# Patient Record
Sex: MALE | Race: WHITE | NOT HISPANIC OR LATINO | Employment: FULL TIME | URBAN - METROPOLITAN AREA
[De-identification: names, ages, dates, MRNs, and addresses within clinical notes are randomized per-mention and may not be internally consistent; named-entity substitution may affect disease eponyms.]

---

## 2017-10-16 ENCOUNTER — ALLSCRIPTS OFFICE VISIT (OUTPATIENT)
Dept: OTHER | Facility: OTHER | Age: 47
End: 2017-10-16

## 2017-10-16 DIAGNOSIS — E78.5 HYPERLIPIDEMIA: ICD-10-CM

## 2017-10-16 DIAGNOSIS — I10 ESSENTIAL (PRIMARY) HYPERTENSION: ICD-10-CM

## 2017-10-17 NOTE — PROGRESS NOTES
Assessment   1  Former smoker (V15 82) (F41 263)  2  Hypertension (401 9) (I10)  3  Hyperlipidemia (272 4) (E78 5)  4  Anxiety (300 00) (F41 9)  5  URI (upper respiratory infection) (465 9) (J06 9)    Plan    Depression screening    · *VB-Depression Screening; Status:Complete - Retrospective By Protocol Authorization;      Done: [id number] 12:00AM   · Temporarily Stop: Fluzone Quadrivalent Intramuscular Suspension  Hyperlipidemia    · (1) LIPID PANEL, FASTING; Status:Active; Requested for:16Oct2017;   Hyperlipidemia, Hypertension    · (1) COMPREHENSIVE METABOLIC PANEL; Status:Active; Requested for:16Oct2017;   PMH: Upper respiratory infection    · Stop: Guaifenesin-Codeine 100-10 MG/5ML SYRP  Sinusitis    · Start: Azithromycin 500 MG Oral Tablet; 2 tablets po first day then one daily for 4 days  URI (upper respiratory infection)    · Stop: Azithromycin 250 MG Oral Tablet   · Stop: Promethazine HCl - 6 25 MG/5ML Oral Syrup   · Stop: Promethazine-Codeine 6 25-10 MG/5ML Oral Syrup  Unlinked    · Stop: Allopurinol TABS   · Stop: Citalopram Hydrobromide TABS   · Stop: Lisinopril TABS   · Stop: Omeprazole 20 MG CPCR   · Stop: Propranolol HCl TABS   · Stop: Simvastatin TABS      Follow-up visit in 4 Months Evaluation and Treatment  Follow-up  Status: Hold For - Scheduling  Requested for: [id number]     Ordered; For: Anxiety, GERD (gastroesophageal reflux disease), Hyperlipidemia, Hypertension;  Ordered By: Haider Chavez  Performed:   Due: [id number]       Discussion/Summary   Discussion Summary:    Rx amoxil 500mg tid for 7 d  Increase water intake  Continue present and cpap  Exercise encouraged  He will get flu shot at work  Rto 6 months  Rx fbw1              Medication SE Review and Pt Understands Tx: Possible side effects of new medications were reviewed with the patient/guardian today  The treatment plan was reviewed with the patient/guardian   The patient/guardian understands and agrees with the treatment plan Self Referrals:    Self Referrals: No       1 Amended By: Kenny Perez; Oct 16 2017 10:07 PM EST      Chief Complaint   Chief Complaint Free Text Note Form: Pt presents today as a new patient  Medications updated- pt will require refills on all six medications  No new allergies  Pt gets annual flu shot by employer  Pt states that he utilizes a cpap machine at night  No difficulties with eating, sleeping, or feelings of depression  History of Present Illness   HPI: Pt has been ok  Developed congestion and fatigue past few days  No fever or cough  Otherwise well  No cp or sob  Bike riding recently for exercise  No recent testing for over a 1  1,2  year  Uses cpap nightly  Feels citalopram has been very helpful  2        1 Amended By: Kenny Perez; Oct 16 2017 10:01 PM EST    2 Amended By: Kenny Perez; Oct 16 2017 10:04 PM EST      Review of Systems   Complete-Male:      Constitutional:1  feeling tired1 , but-- no fever1 -- and-- no chills1   Eyes:1  No complaints of eye pain, no red eyes, no discharge from eyes, no itchy eyes1   ENT:1  earache1 -- and-- nasal discharge1   Cardiovascular: 1  No complaints of slow heart rate, no fast heart rate, no chest pain, no palpitations, no leg claudication, no lower extremity1   Respiratory:1  No complaints of shortness of breath, no wheezing, no cough, no SOB on exertion, no orthopnea or PND1   Gastrointestinal:1  No complaints of abdominal pain, no constipation, no nausea or vomiting, no diarrhea or bloody stools1   Genitourinary:1  No complaints of dysuria, no incontinence, no hesitancy, no nocturia, no genital lesion, no testicular pain1   Musculoskeletal:1  No complaints of arthralgia, no myalgias, no joint swelling or stiffness, no limb pain or swelling1   Integumentary:1  No complaints of skin rash or skin lesions, no itching, no skin wound, no dry skin1         Neurological:1  No compliants of headache, no confusion, no convulsions, no numbness or tingling, no dizziness or fainting, no limb weakness, no difficulty walking1   Psychiatric:1  Is not suicidal, no sleep disturbances, no anxiety or depression, no change in personality, no emotional problems1 -- and-- no sleep disturbances1   Endocrine:1  No complaints of proptosis, no hot flashes, no muscle weakness, no erectile dysfunction, no deepening of the voice, no feelings of weakness1   Hematologic/Lymphatic:1  No complaints of swollen glands, no swollen glands in the neck, does not bleed easily, no easy bruising1         1 Amended By: Jackeline Dunn; Oct 16 2017 10:02 PM EST      Active Problems   1  Anxiety (300 00) (F41 9)  2  GERD (gastroesophageal reflux disease) (530 81) (K21 9)  3  Gout (274 9) (M10 9)  4  Hyperlipidemia (272 4) (E78 5)  5  Hypertension (401 9) (I10)  6  URI (upper respiratory infection) (465 9) (J06 9)    Past Medical History   1  No pertinent past medical history  Active Problems And Past Medical History Reviewed: The active problems and past medical history were reviewed and updated today  Surgical History   1  Denied: History Of Prior Surgery  Surgical History Reviewed: The surgical history was reviewed and updated today  Family History   Mother   1  Family history of malignant neoplasm of breast (V16 3) (Z80 3)  Father   2  Family history of diabetes mellitus (V18 0) (Z83 3)  3  Family history of non-Hodgkin's lymphoma (V16 7) (Z80 7)  Family History Reviewed: The family history was reviewed and updated today  Social History    · Former smoker (L60 66) (A30 650)  Social History Reviewed: The social history was reviewed and updated today  The social history was reviewed and is unchanged  Current Meds   1  Allopurinol 100 MG Oral Tablet; take one tablet by mouth every day; Therapy: (Recorded:16Oct2017) to Recorded  2  Allopurinol TABS; Therapy: (Recorded:07Jun2014) to Recorded  3  Azithromycin 250 MG Oral Tablet; TAKE 2 TABLETS ON DAY 1 THEN TAKE 1 TABLET A     DAY FOR 4 DAYS; Therapy: [id number] to (Last Rx:16Nov2014)  Requested for: [id number] Ordered  4  Citalopram Hydrobromide 40 MG Oral Tablet; TAKE 1 TABLET DAILY; Therapy: (Recorded:16Oct2017) to Recorded  5  Citalopram Hydrobromide TABS; Therapy: (Recorded:07Jun2014) to Recorded  6  Guaifenesin-Codeine 100-10 MG/5ML SYRP; TAKE 1 TO 2 TEASPOONSFUL EVERY 4     TO 6 HOURS AS NEEDED FOR COUGH; Therapy: [id number] to (Evaluate:19Jun2014); Last Rx:10Jun2014 Ordered  7  Lisinopril 5 MG Oral Tablet; TAKE 1 TABLET DAILY; Therapy: (Recorded:16Oct2017) to Recorded  8  Lisinopril TABS; Therapy: (Recorded:07Jun2014) to Recorded  9  Omeprazole 20 MG CPCR; Therapy: (Recorded:07Jun2014) to Recorded  10  Omeprazole 20 MG Oral Capsule Delayed Release; take 1 capsule by mouth once daily; Therapy: (Recorded:16Oct2017) to Recorded  11  Promethazine HCl - 6 25 MG/5ML Oral Syrup; TAKE 2 TEASPOONSFUL EVERY 12      HOURS DAILY; Therapy: [id number] to (Emily Harris)  Requested for: [id number]; Last      Rx:16Nov2014 Ordered  12  Promethazine-Codeine 6 25-10 MG/5ML Oral Syrup; TAKE 1 TEASPOONFUL AT      BEDTIME AS NEEDED; Therapy: [id number] to (Evaluate:29Dwy7136); Last Rx:16Nov2014 Ordered  13  Propranolol HCl - 60 MG Oral Tablet; Take 1 tablet daily; Therapy: (Recorded:16Oct2017) to Recorded  14  Propranolol HCl TABS; Therapy: (Recorded:07Jun2014) to Recorded  15  Simvastatin 20 MG Oral Tablet; TAKE 1 TABLET DAILY; Therapy: (Recorded:16Oct2017) to Recorded  16  Simvastatin TABS; Therapy: (Recorded:07Jun2014) to Recorded    Allergies   1   No Known Drug Allergies    Vitals   Signs   Recorded: 40GAX9120 35:24EN    Systolic: 506 1     Diastolic: 72 1    Recorded: 16Oct2017 05:37PM   Temperature: 98 F  Heart Rate: 73  Height: 6 ft   Weight: 244 lb 6 oz  BMI Calculated: 33 14  BSA Calculated: 2 32  O2 Saturation: 95     1 Amended By: Kyler Gottlieb; Oct 16 2017 10:02 PM EST      Physical Exam        Constitutional1       General appearance: No acute distress, well appearing and well nourished  1       Eyes1       Conjunctiva and lids: No swelling, erythema, or discharge  1       Pupils and irises: Equal, round and reactive to light  1       Ears, Nose, Mouth, and Throat1       External inspection of ears and nose: Normal 1       Otoscopic examination: Tympanic membrance translucent with normal light reflex  Canals patent without erythema  1       Nasal mucosa, septum, and turbinates: Abnormal  1 -- Mucous congestion1   Oropharynx: Abnormal  1 -- Pnd1   Pulmonary1       Respiratory effort: No increased work of breathing or signs of respiratory distress  1       Auscultation of lungs: Clear to auscultation, equal breath sounds bilaterally, no wheezes, no rales, no rhonci  1       Cardiovascular1       Palpation of heart: Normal PMI, no thrills  1       Auscultation of heart: Normal rate and rhythm, normal S1 and S2, without murmurs  1       Examination of extremities for edema and/or varicosities: Normal 1       Carotid pulses: Normal 1       Abdomen1       Abdomen: Non-tender, no masses  1       Liver and spleen: No hepatomegaly or splenomegaly  1       Lymphatic1       Palpation of lymph nodes in neck: No lymphadenopathy  1       Musculoskeletal1       Gait and station: Normal 1       Digits and nails: Normal without clubbing or cyanosis  1       Inspection/palpation of joints, bones, and muscles: Normal 1       Skin1       Skin and subcutaneous tissue: Normal without rashes or lesions  1       Neurologic1       Cranial nerves: Cranial nerves 2-12 intact  1       Reflexes: 2+ and symmetric  1       Sensation: No sensory loss  1       Psychiatric1       Orientation to person, place and time: Normal 1       Mood and affect: Normal 1           1 Amended By: Kyler Gottlieb;  Oct 16 2017 10:05 PM EST      Signatures Electronically signed by : Tamara Mcdowell DO; Oct 16 2017  6:20PM EST                       (Author)     Electronically signed by : Tamara Mcdowell DO; Oct 16 2017 10:07PM EST                       (Author)

## 2017-10-28 ENCOUNTER — TRANSCRIBE ORDERS (OUTPATIENT)
Dept: LAB | Facility: MEDICAL CENTER | Age: 47
End: 2017-10-28

## 2017-10-28 ENCOUNTER — APPOINTMENT (OUTPATIENT)
Dept: LAB | Facility: MEDICAL CENTER | Age: 47
End: 2017-10-28
Payer: COMMERCIAL

## 2017-10-28 DIAGNOSIS — E78.5 HYPERLIPIDEMIA: ICD-10-CM

## 2017-10-28 DIAGNOSIS — I10 ESSENTIAL (PRIMARY) HYPERTENSION: ICD-10-CM

## 2017-10-28 LAB
ALBUMIN SERPL BCP-MCNC: 4.4 G/DL (ref 3.5–5)
ALP SERPL-CCNC: 74 U/L (ref 46–116)
ALT SERPL W P-5'-P-CCNC: 56 U/L (ref 12–78)
ANION GAP SERPL CALCULATED.3IONS-SCNC: 7 MMOL/L (ref 4–13)
AST SERPL W P-5'-P-CCNC: 33 U/L (ref 5–45)
BILIRUB SERPL-MCNC: 0.94 MG/DL (ref 0.2–1)
BUN SERPL-MCNC: 15 MG/DL (ref 5–25)
CALCIUM SERPL-MCNC: 9.5 MG/DL (ref 8.3–10.1)
CHLORIDE SERPL-SCNC: 108 MMOL/L (ref 100–108)
CHOLEST SERPL-MCNC: 144 MG/DL (ref 50–200)
CO2 SERPL-SCNC: 24 MMOL/L (ref 21–32)
CREAT SERPL-MCNC: 1.12 MG/DL (ref 0.6–1.3)
GFR SERPL CREATININE-BSD FRML MDRD: 77 ML/MIN/1.73SQ M
GLUCOSE P FAST SERPL-MCNC: 89 MG/DL (ref 65–99)
HDLC SERPL-MCNC: 53 MG/DL (ref 40–60)
LDLC SERPL CALC-MCNC: 57 MG/DL (ref 0–100)
POTASSIUM SERPL-SCNC: 4 MMOL/L (ref 3.5–5.3)
PROT SERPL-MCNC: 8 G/DL (ref 6.4–8.2)
SODIUM SERPL-SCNC: 139 MMOL/L (ref 136–145)
TRIGL SERPL-MCNC: 171 MG/DL

## 2017-10-28 PROCEDURE — 36415 COLL VENOUS BLD VENIPUNCTURE: CPT

## 2017-10-28 PROCEDURE — 80053 COMPREHEN METABOLIC PANEL: CPT

## 2017-10-28 PROCEDURE — 80061 LIPID PANEL: CPT

## 2018-01-14 VITALS
DIASTOLIC BLOOD PRESSURE: 72 MMHG | SYSTOLIC BLOOD PRESSURE: 112 MMHG | HEART RATE: 73 BPM | HEIGHT: 72 IN | WEIGHT: 244.38 LBS | BODY MASS INDEX: 33.1 KG/M2 | OXYGEN SATURATION: 95 % | TEMPERATURE: 98 F

## 2018-09-15 ENCOUNTER — TELEPHONE (OUTPATIENT)
Dept: INTERNAL MEDICINE CLINIC | Facility: CLINIC | Age: 48
End: 2018-09-15

## 2018-10-09 DIAGNOSIS — K21.9 GASTROESOPHAGEAL REFLUX DISEASE, ESOPHAGITIS PRESENCE NOT SPECIFIED: Primary | ICD-10-CM

## 2018-10-09 RX ORDER — OMEPRAZOLE 20 MG/1
CAPSULE, DELAYED RELEASE ORAL
Qty: 90 CAPSULE | Refills: 3 | Status: SHIPPED | OUTPATIENT
Start: 2018-10-09 | End: 2019-10-06 | Stop reason: SDUPTHER

## 2018-10-28 DIAGNOSIS — F32.A DEPRESSION, UNSPECIFIED DEPRESSION TYPE: Primary | ICD-10-CM

## 2018-10-28 DIAGNOSIS — M10.9 GOUT, UNSPECIFIED CAUSE, UNSPECIFIED CHRONICITY, UNSPECIFIED SITE: ICD-10-CM

## 2018-10-28 DIAGNOSIS — E78.2 MIXED HYPERLIPIDEMIA: ICD-10-CM

## 2018-10-28 DIAGNOSIS — I10 HYPERTENSION, UNSPECIFIED TYPE: ICD-10-CM

## 2018-10-28 RX ORDER — LISINOPRIL 5 MG/1
TABLET ORAL
Qty: 90 TABLET | Refills: 3 | Status: SHIPPED | OUTPATIENT
Start: 2018-10-28 | End: 2019-10-23 | Stop reason: SDUPTHER

## 2018-10-28 RX ORDER — SIMVASTATIN 20 MG
TABLET ORAL
Qty: 90 TABLET | Refills: 3 | Status: SHIPPED | OUTPATIENT
Start: 2018-10-28 | End: 2019-10-23 | Stop reason: SDUPTHER

## 2018-10-28 RX ORDER — ALLOPURINOL 100 MG/1
TABLET ORAL
Qty: 90 TABLET | Refills: 3 | Status: SHIPPED | OUTPATIENT
Start: 2018-10-28 | End: 2019-10-23 | Stop reason: SDUPTHER

## 2018-10-28 RX ORDER — CITALOPRAM 40 MG/1
TABLET ORAL
Qty: 90 TABLET | Refills: 3 | Status: SHIPPED | OUTPATIENT
Start: 2018-10-28 | End: 2019-10-23 | Stop reason: SDUPTHER

## 2018-12-21 RX ORDER — INFLUENZA VIRUS VACCINE 15; 15; 15; 15 UG/.5ML; UG/.5ML; UG/.5ML; UG/.5ML
SUSPENSION INTRAMUSCULAR
Refills: 0 | COMMUNITY
Start: 2018-11-02 | End: 2018-12-26 | Stop reason: ALTCHOICE

## 2018-12-26 ENCOUNTER — OFFICE VISIT (OUTPATIENT)
Dept: INTERNAL MEDICINE CLINIC | Facility: CLINIC | Age: 48
End: 2018-12-26
Payer: COMMERCIAL

## 2018-12-26 VITALS
HEART RATE: 66 BPM | SYSTOLIC BLOOD PRESSURE: 124 MMHG | BODY MASS INDEX: 32.7 KG/M2 | HEIGHT: 72 IN | DIASTOLIC BLOOD PRESSURE: 70 MMHG | WEIGHT: 241.4 LBS | TEMPERATURE: 98.5 F | OXYGEN SATURATION: 96 %

## 2018-12-26 DIAGNOSIS — Z12.11 COLON CANCER SCREENING: ICD-10-CM

## 2018-12-26 DIAGNOSIS — I10 ESSENTIAL HYPERTENSION: Primary | ICD-10-CM

## 2018-12-26 DIAGNOSIS — E78.2 MIXED HYPERLIPIDEMIA: ICD-10-CM

## 2018-12-26 DIAGNOSIS — K21.9 GASTROESOPHAGEAL REFLUX DISEASE, ESOPHAGITIS PRESENCE NOT SPECIFIED: ICD-10-CM

## 2018-12-26 PROCEDURE — 99396 PREV VISIT EST AGE 40-64: CPT | Performed by: INTERNAL MEDICINE

## 2018-12-26 RX ORDER — DIPHENOXYLATE HYDROCHLORIDE AND ATROPINE SULFATE 2.5; .025 MG/1; MG/1
1 TABLET ORAL DAILY
COMMUNITY

## 2018-12-26 RX ORDER — VITAMIN B COMPLEX
1 CAPSULE ORAL DAILY
COMMUNITY

## 2018-12-26 NOTE — PROGRESS NOTES
Assessment/Plan:         Diagnoses and all orders for this visit:    Essential hypertension  Stable on present rx  No added salt Exercise encouraged    Colon cancer screening  -     Occult Blood, Fecal Immunochemical; Future  Did discuss colonocsopy    Gastroesophageal reflux disease, esophagitis presence not specified  Encouraged patient to trial off PPI   Exercise,diet reinforced    Mixed hyperlipidemia  Rx for fasting lipid panel  Exercise,low fat diet    Other orders  -     Discontinue: FLUARIX QUADRIVALENT 0 5 ML TIM; inject 0 5 milliliter intramuscularly  -     b complex vitamins capsule; Take 1 capsule by mouth daily  -     Cholecalciferol (VITAMIN D PO); Take 1 tablet by mouth daily  -     multivitamin (THERAGRAN) TABS; Take 1 tablet by mouth daily  Rto 1 year/prn     Patient ID: Carey Cape is a 46 y o  male  HPI   Pt feels well No acute issues No chest pain or sob  He has not had any acid reflux sxs  His diet he admits is not very healthy but he is happy  He has not been exercising regularly  Review of Systems   Constitutional: Negative  HENT: Negative  Eyes: Negative  Respiratory: Negative  Cardiovascular: Negative  Gastrointestinal: Negative  Endocrine: Negative  Genitourinary: Negative  Musculoskeletal: Negative  Skin: Negative  Allergic/Immunologic: Negative  Neurological: Negative  Hematological: Negative  Psychiatric/Behavioral: Negative  No past medical history on file  Past Surgical History:   Procedure Laterality Date    NO PAST SURGERIES       Social History     Social History    Marital status: Unknown     Spouse name: N/A    Number of children: N/A    Years of education: N/A     Occupational History    Not on file       Social History Main Topics    Smoking status: Former Smoker    Smokeless tobacco: Never Used    Alcohol use Yes      Comment: occassionally    Drug use: No    Sexual activity: Not on file     Other Topics Concern    Not on file     Social History Narrative    No narrative on file     No Known Allergies        /70   Pulse 66   Temp 98 5 °F (36 9 °C) (Tympanic)   Ht 6' (1 829 m)   Wt 109 kg (241 lb 6 4 oz)   SpO2 96%   BMI 32 74 kg/m²          Physical Exam   Constitutional: He is oriented to person, place, and time  He appears well-developed  No distress  HENT:   Head: Normocephalic and atraumatic  Right Ear: External ear normal    Left Ear: External ear normal    Nose: Nose normal    Mouth/Throat: Oropharynx is clear and moist  No oropharyngeal exudate  Eyes: Pupils are equal, round, and reactive to light  Conjunctivae and EOM are normal  No scleral icterus  Neck: Normal range of motion  Neck supple  No JVD present  Cardiovascular: Normal rate, regular rhythm, normal heart sounds and intact distal pulses  No murmur heard  Pulmonary/Chest: Effort normal and breath sounds normal  No respiratory distress  He has no wheezes  He has no rales  Abdominal: Soft  Bowel sounds are normal  He exhibits no distension  There is no tenderness  There is no rebound  Musculoskeletal: Normal range of motion  He exhibits no edema, tenderness or deformity  Lymphadenopathy:     He has no cervical adenopathy  Neurological: He is alert and oriented to person, place, and time  He has normal reflexes  He displays normal reflexes  No cranial nerve deficit  He exhibits normal muscle tone  Coordination normal    Skin: Skin is warm and dry  He is not diaphoretic  Psychiatric: He has a normal mood and affect  His behavior is normal  Judgment and thought content normal    Nursing note and vitals reviewed

## 2018-12-29 ENCOUNTER — LAB (OUTPATIENT)
Dept: LAB | Facility: MEDICAL CENTER | Age: 48
End: 2018-12-29
Payer: COMMERCIAL

## 2018-12-29 DIAGNOSIS — E78.2 MIXED HYPERLIPIDEMIA: ICD-10-CM

## 2018-12-29 DIAGNOSIS — Z12.11 COLON CANCER SCREENING: ICD-10-CM

## 2018-12-29 DIAGNOSIS — I10 ESSENTIAL HYPERTENSION: ICD-10-CM

## 2018-12-29 LAB
ALBUMIN SERPL BCP-MCNC: 3.8 G/DL (ref 3.5–5)
ALP SERPL-CCNC: 70 U/L (ref 46–116)
ALT SERPL W P-5'-P-CCNC: 44 U/L (ref 12–78)
ANION GAP SERPL CALCULATED.3IONS-SCNC: 6 MMOL/L (ref 4–13)
AST SERPL W P-5'-P-CCNC: 21 U/L (ref 5–45)
BILIRUB SERPL-MCNC: 0.8 MG/DL (ref 0.2–1)
BUN SERPL-MCNC: 16 MG/DL (ref 5–25)
CALCIUM SERPL-MCNC: 9.1 MG/DL (ref 8.3–10.1)
CHLORIDE SERPL-SCNC: 107 MMOL/L (ref 100–108)
CHOLEST SERPL-MCNC: 149 MG/DL (ref 50–200)
CO2 SERPL-SCNC: 27 MMOL/L (ref 21–32)
CREAT SERPL-MCNC: 1.01 MG/DL (ref 0.6–1.3)
GFR SERPL CREATININE-BSD FRML MDRD: 86 ML/MIN/1.73SQ M
GLUCOSE P FAST SERPL-MCNC: 91 MG/DL (ref 65–99)
HDLC SERPL-MCNC: 42 MG/DL (ref 40–60)
HEMOCCULT STL QL IA: NEGATIVE
LDLC SERPL CALC-MCNC: 57 MG/DL (ref 0–100)
NONHDLC SERPL-MCNC: 107 MG/DL
POTASSIUM SERPL-SCNC: 4.3 MMOL/L (ref 3.5–5.3)
PROT SERPL-MCNC: 7.3 G/DL (ref 6.4–8.2)
SODIUM SERPL-SCNC: 140 MMOL/L (ref 136–145)
TRIGL SERPL-MCNC: 252 MG/DL

## 2018-12-29 PROCEDURE — 36415 COLL VENOUS BLD VENIPUNCTURE: CPT

## 2018-12-29 PROCEDURE — 80061 LIPID PANEL: CPT

## 2018-12-29 PROCEDURE — 80053 COMPREHEN METABOLIC PANEL: CPT

## 2018-12-29 PROCEDURE — G0328 FECAL BLOOD SCRN IMMUNOASSAY: HCPCS

## 2019-03-06 DIAGNOSIS — J01.00 ACUTE MAXILLARY SINUSITIS, RECURRENCE NOT SPECIFIED: Primary | ICD-10-CM

## 2019-03-06 RX ORDER — AZITHROMYCIN 250 MG/1
TABLET, FILM COATED ORAL
Qty: 6 TABLET | Refills: 0 | Status: SHIPPED | OUTPATIENT
Start: 2019-03-06 | End: 2019-03-10

## 2019-10-06 DIAGNOSIS — K21.9 GASTROESOPHAGEAL REFLUX DISEASE, ESOPHAGITIS PRESENCE NOT SPECIFIED: ICD-10-CM

## 2019-10-06 RX ORDER — OMEPRAZOLE 20 MG/1
CAPSULE, DELAYED RELEASE ORAL
Qty: 90 CAPSULE | Refills: 4 | Status: SHIPPED | OUTPATIENT
Start: 2019-10-06 | End: 2020-12-29

## 2019-10-23 DIAGNOSIS — F32.A DEPRESSION, UNSPECIFIED DEPRESSION TYPE: ICD-10-CM

## 2019-10-23 DIAGNOSIS — E78.2 MIXED HYPERLIPIDEMIA: ICD-10-CM

## 2019-10-23 DIAGNOSIS — I10 HYPERTENSION, UNSPECIFIED TYPE: ICD-10-CM

## 2019-10-23 DIAGNOSIS — M10.9 GOUT, UNSPECIFIED CAUSE, UNSPECIFIED CHRONICITY, UNSPECIFIED SITE: ICD-10-CM

## 2019-10-23 RX ORDER — CITALOPRAM 40 MG/1
TABLET ORAL
Qty: 90 TABLET | Refills: 4 | Status: SHIPPED | OUTPATIENT
Start: 2019-10-23 | End: 2021-01-15

## 2019-10-23 RX ORDER — ALLOPURINOL 100 MG/1
TABLET ORAL
Qty: 90 TABLET | Refills: 4 | Status: SHIPPED | OUTPATIENT
Start: 2019-10-23 | End: 2021-01-15

## 2019-10-23 RX ORDER — SIMVASTATIN 20 MG
TABLET ORAL
Qty: 90 TABLET | Refills: 4 | Status: SHIPPED | OUTPATIENT
Start: 2019-10-23 | End: 2021-01-15

## 2019-10-23 RX ORDER — LISINOPRIL 5 MG/1
TABLET ORAL
Qty: 90 TABLET | Refills: 4 | Status: SHIPPED | OUTPATIENT
Start: 2019-10-23 | End: 2021-01-15

## 2020-01-09 ENCOUNTER — TELEPHONE (OUTPATIENT)
Dept: INTERNAL MEDICINE CLINIC | Facility: CLINIC | Age: 50
End: 2020-01-09

## 2020-01-09 DIAGNOSIS — J01.00 ACUTE NON-RECURRENT MAXILLARY SINUSITIS: Primary | ICD-10-CM

## 2020-01-09 RX ORDER — AZITHROMYCIN 250 MG/1
TABLET, FILM COATED ORAL
Qty: 6 TABLET | Refills: 0 | Status: SHIPPED | OUTPATIENT
Start: 2020-01-09 | End: 2020-01-13

## 2020-01-09 NOTE — TELEPHONE ENCOUNTER
Patient with sinus congestion, pressure, aches, pains, cough and sore throat        JOE Vegaias Mansi     [phone]

## 2020-11-16 ENCOUNTER — OFFICE VISIT (OUTPATIENT)
Dept: INTERNAL MEDICINE CLINIC | Facility: CLINIC | Age: 50
End: 2020-11-16
Payer: COMMERCIAL

## 2020-11-16 VITALS
SYSTOLIC BLOOD PRESSURE: 124 MMHG | WEIGHT: 246 LBS | OXYGEN SATURATION: 97 % | DIASTOLIC BLOOD PRESSURE: 78 MMHG | TEMPERATURE: 96.9 F | HEART RATE: 68 BPM | HEIGHT: 72 IN | BODY MASS INDEX: 33.32 KG/M2

## 2020-11-16 DIAGNOSIS — J01.00 SUBACUTE MAXILLARY SINUSITIS: ICD-10-CM

## 2020-11-16 DIAGNOSIS — Z00.00 ANNUAL PHYSICAL EXAM: Primary | ICD-10-CM

## 2020-11-16 DIAGNOSIS — F41.9 ANXIETY: ICD-10-CM

## 2020-11-16 DIAGNOSIS — E78.2 MIXED HYPERLIPIDEMIA: ICD-10-CM

## 2020-11-16 DIAGNOSIS — Z12.11 COLON CANCER SCREENING: ICD-10-CM

## 2020-11-16 PROCEDURE — 3078F DIAST BP <80 MM HG: CPT | Performed by: INTERNAL MEDICINE

## 2020-11-16 PROCEDURE — 3725F SCREEN DEPRESSION PERFORMED: CPT | Performed by: INTERNAL MEDICINE

## 2020-11-16 PROCEDURE — 1036F TOBACCO NON-USER: CPT | Performed by: INTERNAL MEDICINE

## 2020-11-16 PROCEDURE — 3074F SYST BP LT 130 MM HG: CPT | Performed by: INTERNAL MEDICINE

## 2020-11-16 PROCEDURE — 3008F BODY MASS INDEX DOCD: CPT | Performed by: INTERNAL MEDICINE

## 2020-11-16 PROCEDURE — 99396 PREV VISIT EST AGE 40-64: CPT | Performed by: INTERNAL MEDICINE

## 2020-11-16 RX ORDER — TRAZODONE HYDROCHLORIDE 50 MG/1
50 TABLET ORAL
Qty: 30 TABLET | Refills: 5 | Status: SHIPPED | OUTPATIENT
Start: 2020-11-16 | End: 2021-12-21 | Stop reason: SDUPTHER

## 2020-11-16 RX ORDER — BUPROPION HYDROCHLORIDE 150 MG/1
150 TABLET ORAL EVERY MORNING
Qty: 30 TABLET | Refills: 5 | Status: SHIPPED | OUTPATIENT
Start: 2020-11-16 | End: 2021-06-21 | Stop reason: ALTCHOICE

## 2020-11-16 RX ORDER — AZITHROMYCIN 250 MG/1
TABLET, FILM COATED ORAL
Qty: 6 TABLET | Refills: 0 | Status: SHIPPED | OUTPATIENT
Start: 2020-11-16 | End: 2020-11-20

## 2020-12-16 ENCOUNTER — LAB (OUTPATIENT)
Dept: LAB | Facility: MEDICAL CENTER | Age: 50
End: 2020-12-16
Payer: COMMERCIAL

## 2020-12-16 DIAGNOSIS — E78.2 MIXED HYPERLIPIDEMIA: ICD-10-CM

## 2020-12-16 DIAGNOSIS — Z00.00 ANNUAL PHYSICAL EXAM: ICD-10-CM

## 2020-12-16 DIAGNOSIS — Z12.11 COLON CANCER SCREENING: ICD-10-CM

## 2020-12-16 DIAGNOSIS — J01.00 SUBACUTE MAXILLARY SINUSITIS: ICD-10-CM

## 2020-12-16 LAB
ALBUMIN SERPL BCP-MCNC: 4.1 G/DL (ref 3.5–5)
ALP SERPL-CCNC: 86 U/L (ref 46–116)
ALT SERPL W P-5'-P-CCNC: 56 U/L (ref 12–78)
ANION GAP SERPL CALCULATED.3IONS-SCNC: 5 MMOL/L (ref 4–13)
AST SERPL W P-5'-P-CCNC: 35 U/L (ref 5–45)
BILIRUB SERPL-MCNC: 1.08 MG/DL (ref 0.2–1)
BUN SERPL-MCNC: 14 MG/DL (ref 5–25)
CALCIUM SERPL-MCNC: 9.9 MG/DL (ref 8.3–10.1)
CHLORIDE SERPL-SCNC: 109 MMOL/L (ref 100–108)
CHOLEST SERPL-MCNC: 142 MG/DL (ref 50–200)
CO2 SERPL-SCNC: 24 MMOL/L (ref 21–32)
CREAT SERPL-MCNC: 1.07 MG/DL (ref 0.6–1.3)
GFR SERPL CREATININE-BSD FRML MDRD: 79 ML/MIN/1.73SQ M
GLUCOSE P FAST SERPL-MCNC: 96 MG/DL (ref 65–99)
HDLC SERPL-MCNC: 43 MG/DL
HEMOCCULT STL QL IA: NEGATIVE
LDLC SERPL CALC-MCNC: 77 MG/DL (ref 0–100)
NONHDLC SERPL-MCNC: 99 MG/DL
POTASSIUM SERPL-SCNC: 4.1 MMOL/L (ref 3.5–5.3)
PROT SERPL-MCNC: 7.6 G/DL (ref 6.4–8.2)
PSA SERPL-MCNC: 0.4 NG/ML (ref 0–4)
SODIUM SERPL-SCNC: 138 MMOL/L (ref 136–145)
TRIGL SERPL-MCNC: 111 MG/DL

## 2020-12-16 PROCEDURE — 80053 COMPREHEN METABOLIC PANEL: CPT

## 2020-12-16 PROCEDURE — 36415 COLL VENOUS BLD VENIPUNCTURE: CPT

## 2020-12-16 PROCEDURE — 80061 LIPID PANEL: CPT

## 2020-12-16 PROCEDURE — G0328 FECAL BLOOD SCRN IMMUNOASSAY: HCPCS

## 2020-12-16 PROCEDURE — G0103 PSA SCREENING: HCPCS

## 2020-12-29 DIAGNOSIS — K21.9 GASTROESOPHAGEAL REFLUX DISEASE: ICD-10-CM

## 2020-12-29 RX ORDER — OMEPRAZOLE 20 MG/1
CAPSULE, DELAYED RELEASE ORAL
Qty: 90 CAPSULE | Refills: 3 | Status: SHIPPED | OUTPATIENT
Start: 2020-12-29 | End: 2021-12-06

## 2021-01-15 DIAGNOSIS — F32.A DEPRESSION, UNSPECIFIED DEPRESSION TYPE: ICD-10-CM

## 2021-01-15 DIAGNOSIS — E78.2 MIXED HYPERLIPIDEMIA: ICD-10-CM

## 2021-01-15 DIAGNOSIS — I10 HYPERTENSION, UNSPECIFIED TYPE: ICD-10-CM

## 2021-01-15 DIAGNOSIS — M10.9 GOUT, UNSPECIFIED CAUSE, UNSPECIFIED CHRONICITY, UNSPECIFIED SITE: ICD-10-CM

## 2021-01-15 RX ORDER — LISINOPRIL 5 MG/1
TABLET ORAL
Qty: 90 TABLET | Refills: 3 | Status: SHIPPED | OUTPATIENT
Start: 2021-01-15 | End: 2022-01-10

## 2021-01-15 RX ORDER — CITALOPRAM 40 MG/1
TABLET ORAL
Qty: 90 TABLET | Refills: 3 | Status: SHIPPED | OUTPATIENT
Start: 2021-01-15 | End: 2022-01-10

## 2021-01-15 RX ORDER — SIMVASTATIN 20 MG
TABLET ORAL
Qty: 90 TABLET | Refills: 3 | Status: SHIPPED | OUTPATIENT
Start: 2021-01-15 | End: 2022-01-10

## 2021-01-15 RX ORDER — ALLOPURINOL 100 MG/1
TABLET ORAL
Qty: 90 TABLET | Refills: 3 | Status: SHIPPED | OUTPATIENT
Start: 2021-01-15 | End: 2022-01-10

## 2021-02-15 ENCOUNTER — TELEPHONE (OUTPATIENT)
Dept: INTERNAL MEDICINE CLINIC | Facility: CLINIC | Age: 51
End: 2021-02-15

## 2021-02-15 ENCOUNTER — OFFICE VISIT (OUTPATIENT)
Dept: INTERNAL MEDICINE CLINIC | Facility: CLINIC | Age: 51
End: 2021-02-15
Payer: COMMERCIAL

## 2021-02-15 VITALS
BODY MASS INDEX: 33.1 KG/M2 | SYSTOLIC BLOOD PRESSURE: 126 MMHG | WEIGHT: 244.38 LBS | DIASTOLIC BLOOD PRESSURE: 78 MMHG | HEIGHT: 72 IN | TEMPERATURE: 98.1 F

## 2021-02-15 DIAGNOSIS — F41.9 ANXIETY: ICD-10-CM

## 2021-02-15 DIAGNOSIS — E78.2 MIXED HYPERLIPIDEMIA: ICD-10-CM

## 2021-02-15 DIAGNOSIS — I10 ESSENTIAL HYPERTENSION: Primary | ICD-10-CM

## 2021-02-15 PROCEDURE — 99214 OFFICE O/P EST MOD 30 MIN: CPT | Performed by: INTERNAL MEDICINE

## 2021-02-15 PROCEDURE — 3008F BODY MASS INDEX DOCD: CPT | Performed by: INTERNAL MEDICINE

## 2021-02-15 PROCEDURE — 1036F TOBACCO NON-USER: CPT | Performed by: INTERNAL MEDICINE

## 2021-02-15 NOTE — PROGRESS NOTES
Assessment/Plan:        Htn  Bp stable No added salt diet Increase fluids intake  He will be more active once the weather breaks    Hyperlipidemia  His lipid profile is stable on statin Low fat diet and exercise reinforced    Anxiety   Pt thought of maybe increasing Wellbutrin but for now he will stay on current rx   Overall sxs better He is scheduled for family counselling session next week with his daughter     Rto 4 months    Patient ID: Brigitte Mckoy is a 44 y o  male  HPI  Pt doing ok He has had more stress with family stressors - his daughter did agree to family counselling which is scheduled for next week He is hoping that will help settle things more but not overly optimistic No chest pain or sob He is sleeping better He did think about increasing wellbutrin due to recent stress but feels he will stay on current rx for now as things are settling he hopes  He will be more active when the weather improves but has been ice fishing recently       Review of Systems   Constitutional: Negative  HENT: Negative  Respiratory: Negative  Cardiovascular: Negative  Gastrointestinal: Negative  Genitourinary: Negative  Musculoskeletal: Negative  Neurological: Negative  Psychiatric/Behavioral: Negative          Past Medical History:   Diagnosis Date    GERD (gastroesophageal reflux disease)     Hyperlipidemia     Hypertension      Past Surgical History:   Procedure Laterality Date    NO PAST SURGERIES      VASECTOMY       Social History     Socioeconomic History    Marital status: /Civil Union     Spouse name: Not on file    Number of children: Not on file    Years of education: Not on file    Highest education level: Not on file   Occupational History    Not on file   Social Needs    Financial resource strain: Not on file    Food insecurity     Worry: Not on file     Inability: Not on file    Transportation needs     Medical: Not on file     Non-medical: Not on file   Tobacco Use    Smoking status: Former Smoker    Smokeless tobacco: Never Used   Substance and Sexual Activity    Alcohol use: Yes     Comment: occassionally    Drug use: No    Sexual activity: Not on file   Lifestyle    Physical activity     Days per week: Not on file     Minutes per session: Not on file    Stress: Not on file   Relationships    Social connections     Talks on phone: Not on file     Gets together: Not on file     Attends Gnosticism service: Not on file     Active member of club or organization: Not on file     Attends meetings of clubs or organizations: Not on file     Relationship status: Not on file    Intimate partner violence     Fear of current or ex partner: Not on file     Emotionally abused: Not on file     Physically abused: Not on file     Forced sexual activity: Not on file   Other Topics Concern    Not on file   Social History Narrative    Not on file     No Known Allergies  BMI Counseling: Body mass index is 33 14 kg/m²  The BMI is above normal  Nutrition recommendations include consuming healthier snacks and moderation in carbohydrate intake  Exercise recommendations include exercising 3-5 times per week  No pharmacotherapy was ordered  /78   Temp 98 1 °F (36 7 °C) (Temporal)   Ht 6' (1 829 m)   Wt 111 kg (244 lb 6 oz)   BMI 33 14 kg/m²          Physical Exam  Vitals signs reviewed  Constitutional:       Appearance: Normal appearance  Neurological:      Mental Status: He is alert

## 2021-03-26 ENCOUNTER — IMMUNIZATIONS (OUTPATIENT)
Dept: FAMILY MEDICINE CLINIC | Facility: HOSPITAL | Age: 51
End: 2021-03-26

## 2021-03-26 DIAGNOSIS — Z23 ENCOUNTER FOR IMMUNIZATION: Primary | ICD-10-CM

## 2021-03-26 PROCEDURE — 0011A SARS-COV-2 / COVID-19 MRNA VACCINE (MODERNA) 100 MCG: CPT

## 2021-03-26 PROCEDURE — 91301 SARS-COV-2 / COVID-19 MRNA VACCINE (MODERNA) 100 MCG: CPT

## 2021-04-28 ENCOUNTER — IMMUNIZATIONS (OUTPATIENT)
Dept: FAMILY MEDICINE CLINIC | Facility: HOSPITAL | Age: 51
End: 2021-04-28

## 2021-04-28 DIAGNOSIS — Z23 ENCOUNTER FOR IMMUNIZATION: Primary | ICD-10-CM

## 2021-04-28 PROCEDURE — 0012A SARS-COV-2 / COVID-19 MRNA VACCINE (MODERNA) 100 MCG: CPT

## 2021-04-28 PROCEDURE — 91301 SARS-COV-2 / COVID-19 MRNA VACCINE (MODERNA) 100 MCG: CPT

## 2021-06-15 ENCOUNTER — RA CDI HCC (OUTPATIENT)
Dept: OTHER | Facility: HOSPITAL | Age: 51
End: 2021-06-15

## 2021-06-15 NOTE — PROGRESS NOTES
[facility]  coding opportunities             Chart reviewed, (number of) suggestions sent to provider: 1     Problem listed updated  Provider Accepted, (number of) suggestions accepted: 1         Number of suggestions actually used: 1         Patients insurance company: [employer] (Medicare Advantage and Commercial)     Visit status: Patient arrived for their scheduled appointment        [facility]  coding opportunities             Chart reviewed, (number of) suggestions sent to provider: 1        [place] [id number]  coding opportunities             Chart reviewed, (number of) suggestions sent to provider: 1     Problem listed updated   Provider Accepted, (number of) suggestions accepted: 1               Patients insurance company: [employer] (Medicare Advantage and Commercial)                       Patients insurance company: [employer] (Medicare Advantage and Commercial)           Depression - Can Depression be further classified using more specific code from F32 0 - F32 5 or F33 0 - F33 9    Provider selected F32 1

## 2021-06-16 PROBLEM — F32.1 MAJOR DEPRESSIVE DISORDER, SINGLE EPISODE, MODERATE (HCC): Status: ACTIVE | Noted: 2021-06-16

## 2021-06-21 ENCOUNTER — OFFICE VISIT (OUTPATIENT)
Dept: INTERNAL MEDICINE CLINIC | Facility: CLINIC | Age: 51
End: 2021-06-21
Payer: COMMERCIAL

## 2021-06-21 VITALS
SYSTOLIC BLOOD PRESSURE: 124 MMHG | HEIGHT: 72 IN | DIASTOLIC BLOOD PRESSURE: 70 MMHG | TEMPERATURE: 97.1 F | HEART RATE: 65 BPM | OXYGEN SATURATION: 95 % | BODY MASS INDEX: 33.59 KG/M2 | WEIGHT: 248 LBS

## 2021-06-21 DIAGNOSIS — F32.1 MAJOR DEPRESSIVE DISORDER, SINGLE EPISODE, MODERATE (HCC): ICD-10-CM

## 2021-06-21 DIAGNOSIS — E78.2 MIXED HYPERLIPIDEMIA: ICD-10-CM

## 2021-06-21 DIAGNOSIS — I10 ESSENTIAL HYPERTENSION: Primary | ICD-10-CM

## 2021-06-21 PROCEDURE — 1036F TOBACCO NON-USER: CPT | Performed by: INTERNAL MEDICINE

## 2021-06-21 PROCEDURE — 3074F SYST BP LT 130 MM HG: CPT | Performed by: INTERNAL MEDICINE

## 2021-06-21 PROCEDURE — 99214 OFFICE O/P EST MOD 30 MIN: CPT | Performed by: INTERNAL MEDICINE

## 2021-06-21 PROCEDURE — 3008F BODY MASS INDEX DOCD: CPT | Performed by: INTERNAL MEDICINE

## 2021-06-21 PROCEDURE — 3078F DIAST BP <80 MM HG: CPT | Performed by: INTERNAL MEDICINE

## 2021-06-21 RX ORDER — ZINC GLUCONATE 50 MG
50 TABLET ORAL DAILY
COMMUNITY

## 2021-06-21 NOTE — PROGRESS NOTES
Assessment/Plan:    HTN  BP stable No added salt     Hyperlipidemia  Low fat diet   Recheck in Fall    Major Depression in remission  Continue celexa  Trazadone prn       Rto 6 months              Patient ID: Mohan Agosto is a 44 y o  male  HPI  Pt doing much better Family situation is much more settled - his daughter came recently and they did do family therapy sessions which were helpful He walks for exercise No chest pain or sob He is feeling well overall Trying to stay on healthy diet and gerd sxs are managed He stopped wellbutrin when rx ran out and feels sxs stable he does take Trazadone which has helped sleep improve       Review of Systems   Constitutional: Negative  HENT: Negative  Respiratory: Negative  Cardiovascular: Negative  Gastrointestinal: Negative  Genitourinary: Negative  Musculoskeletal: Negative  Skin: Negative  Neurological: Negative  Hematological: Negative  Psychiatric/Behavioral: Negative          Past Medical History:   Diagnosis Date    GERD (gastroesophageal reflux disease)     Hyperlipidemia     Hypertension      Past Surgical History:   Procedure Laterality Date    NO PAST SURGERIES      VASECTOMY       Social History     Socioeconomic History    Marital status: /Civil Union     Spouse name: Not on file    Number of children: Not on file    Years of education: Not on file    Highest education level: Not on file   Occupational History    Not on file   Tobacco Use    Smoking status: Former Smoker    Smokeless tobacco: Never Used   Vaping Use    Vaping Use: Never used   Substance and Sexual Activity    Alcohol use: Yes     Comment: occassionally    Drug use: No    Sexual activity: Not on file   Other Topics Concern    Not on file   Social History Narrative    Not on file     Social Determinants of Health     Financial Resource Strain:     Difficulty of Paying Living Expenses:    Food Insecurity:     Worried About Running Out of Food in the Last Year:    [address] in the Last Year:    Transportation Needs:     Lack of Transportation (Medical):  Lack of Transportation (Non-Medical):    Physical Activity:     Days of Exercise per Week:     Minutes of Exercise per Session:    Stress:     Feeling of Stress :    Social Connections:     Frequency of Communication with Friends and Family:     Frequency of Social Gatherings with Friends and Family:     Attends Latter-day Services:     Active Member of Clubs or Organizations:     Attends Club or Organization Meetings:     Marital Status:    Intimate Partner Violence:     Fear of Current or Ex-Partner:     Emotionally Abused:     Physically Abused:     Sexually Abused:      No Known Allergies         /70   Pulse 65   Temp (!) 97 1 °F (36 2 °C) (Temporal)   Ht 6' (1 829 m)   Wt 112 kg (248 lb)   SpO2 95%   BMI 33 63 kg/m²          Physical Exam  Constitutional:       Appearance: Normal appearance  HENT:      Head: Normocephalic and atraumatic  Right Ear: Tympanic membrane, ear canal and external ear normal  There is no impacted cerumen  Left Ear: Tympanic membrane, ear canal and external ear normal  There is no impacted cerumen  Nose: Nose normal       Mouth/Throat:      Mouth: Mucous membranes are dry  Eyes:      General: No scleral icterus  Extraocular Movements: Extraocular movements intact  Conjunctiva/sclera: Conjunctivae normal       Pupils: Pupils are equal, round, and reactive to light  Cardiovascular:      Rate and Rhythm: Normal rate and regular rhythm  Pulses: Normal pulses  Heart sounds: Normal heart sounds  Pulmonary:      Effort: Pulmonary effort is normal  No respiratory distress  Breath sounds: Normal breath sounds  No wheezing  Abdominal:      General: Bowel sounds are normal  There is no distension  Palpations: Abdomen is soft  Tenderness: There is no abdominal tenderness  Musculoskeletal:         General: Normal range of motion  Cervical back: Normal range of motion and neck supple  No rigidity  Right lower leg: No edema  Left lower leg: No edema  Lymphadenopathy:      Cervical: No cervical adenopathy  Skin:     General: Skin is dry  Coloration: Skin is not jaundiced or pale  Findings: No erythema  Neurological:      General: No focal deficit present  Mental Status: He is alert and oriented to person, place, and time  Mental status is at baseline  Cranial Nerves: No cranial nerve deficit  Sensory: No sensory deficit  Coordination: Coordination normal    Psychiatric:         Mood and Affect: Mood normal          Behavior: Behavior normal          Thought Content:  Thought content normal          Judgment: Judgment normal

## 2021-06-30 ENCOUNTER — VBI (OUTPATIENT)
Dept: ADMINISTRATIVE | Facility: OTHER | Age: 51
End: 2021-06-30

## 2021-07-03 ENCOUNTER — APPOINTMENT (OUTPATIENT)
Dept: LAB | Facility: MEDICAL CENTER | Age: 51
End: 2021-07-03
Payer: COMMERCIAL

## 2021-07-03 DIAGNOSIS — E78.2 MIXED HYPERLIPIDEMIA: ICD-10-CM

## 2021-07-03 DIAGNOSIS — I10 ESSENTIAL HYPERTENSION: ICD-10-CM

## 2021-07-03 LAB
ALBUMIN SERPL BCP-MCNC: 4.1 G/DL (ref 3.5–5)
ALP SERPL-CCNC: 72 U/L (ref 46–116)
ALT SERPL W P-5'-P-CCNC: 62 U/L (ref 12–78)
ANION GAP SERPL CALCULATED.3IONS-SCNC: 5 MMOL/L (ref 4–13)
AST SERPL W P-5'-P-CCNC: 33 U/L (ref 5–45)
BILIRUB SERPL-MCNC: 1.96 MG/DL (ref 0.2–1)
BUN SERPL-MCNC: 13 MG/DL (ref 5–25)
CALCIUM SERPL-MCNC: 9.5 MG/DL (ref 8.3–10.1)
CHLORIDE SERPL-SCNC: 107 MMOL/L (ref 100–108)
CHOLEST SERPL-MCNC: 144 MG/DL (ref 50–200)
CO2 SERPL-SCNC: 26 MMOL/L (ref 21–32)
CREAT SERPL-MCNC: 0.97 MG/DL (ref 0.6–1.3)
GFR SERPL CREATININE-BSD FRML MDRD: 89 ML/MIN/1.73SQ M
GLUCOSE P FAST SERPL-MCNC: 72 MG/DL (ref 65–99)
HDLC SERPL-MCNC: 44 MG/DL
LDLC SERPL CALC-MCNC: 71 MG/DL (ref 0–100)
NONHDLC SERPL-MCNC: 100 MG/DL
POTASSIUM SERPL-SCNC: 4.2 MMOL/L (ref 3.5–5.3)
PROT SERPL-MCNC: 7.8 G/DL (ref 6.4–8.2)
SODIUM SERPL-SCNC: 138 MMOL/L (ref 136–145)
TRIGL SERPL-MCNC: 143 MG/DL

## 2021-07-03 PROCEDURE — 80053 COMPREHEN METABOLIC PANEL: CPT

## 2021-07-03 PROCEDURE — 80061 LIPID PANEL: CPT

## 2021-07-03 PROCEDURE — 36415 COLL VENOUS BLD VENIPUNCTURE: CPT

## 2021-12-06 DIAGNOSIS — K21.9 GASTROESOPHAGEAL REFLUX DISEASE: ICD-10-CM

## 2021-12-06 RX ORDER — OMEPRAZOLE 20 MG/1
CAPSULE, DELAYED RELEASE ORAL
Qty: 90 CAPSULE | Refills: 3 | Status: SHIPPED | OUTPATIENT
Start: 2021-12-06

## 2021-12-07 ENCOUNTER — TELEPHONE (OUTPATIENT)
Dept: FAMILY MEDICINE CLINIC | Facility: CLINIC | Age: 51
End: 2021-12-07

## 2021-12-07 DIAGNOSIS — Z20.822 EXPOSURE TO COVID-19 VIRUS: Primary | ICD-10-CM

## 2021-12-07 PROCEDURE — U0003 INFECTIOUS AGENT DETECTION BY NUCLEIC ACID (DNA OR RNA); SEVERE ACUTE RESPIRATORY SYNDROME CORONAVIRUS 2 (SARS-COV-2) (CORONAVIRUS DISEASE [COVID-19]), AMPLIFIED PROBE TECHNIQUE, MAKING USE OF HIGH THROUGHPUT TECHNOLOGIES AS DESCRIBED BY CMS-2020-01-R: HCPCS | Performed by: INTERNAL MEDICINE

## 2021-12-07 PROCEDURE — U0005 INFEC AGEN DETEC AMPLI PROBE: HCPCS | Performed by: INTERNAL MEDICINE

## 2021-12-14 ENCOUNTER — RA CDI HCC (OUTPATIENT)
Dept: OTHER | Facility: HOSPITAL | Age: 51
End: 2021-12-14

## 2021-12-21 ENCOUNTER — OFFICE VISIT (OUTPATIENT)
Dept: FAMILY MEDICINE CLINIC | Facility: CLINIC | Age: 51
End: 2021-12-21
Payer: COMMERCIAL

## 2021-12-21 VITALS
HEIGHT: 72 IN | RESPIRATION RATE: 18 BRPM | BODY MASS INDEX: 33.67 KG/M2 | DIASTOLIC BLOOD PRESSURE: 78 MMHG | WEIGHT: 248.6 LBS | HEART RATE: 76 BPM | TEMPERATURE: 98.3 F | SYSTOLIC BLOOD PRESSURE: 126 MMHG

## 2021-12-21 DIAGNOSIS — F41.9 ANXIETY: ICD-10-CM

## 2021-12-21 DIAGNOSIS — F51.01 PRIMARY INSOMNIA: ICD-10-CM

## 2021-12-21 DIAGNOSIS — Z23 ENCOUNTER FOR IMMUNIZATION: ICD-10-CM

## 2021-12-21 DIAGNOSIS — Z00.00 ANNUAL PHYSICAL EXAM: Primary | ICD-10-CM

## 2021-12-21 PROCEDURE — 3725F SCREEN DEPRESSION PERFORMED: CPT | Performed by: INTERNAL MEDICINE

## 2021-12-21 PROCEDURE — 90471 IMMUNIZATION ADMIN: CPT | Performed by: INTERNAL MEDICINE

## 2021-12-21 PROCEDURE — 1036F TOBACCO NON-USER: CPT | Performed by: INTERNAL MEDICINE

## 2021-12-21 PROCEDURE — 3008F BODY MASS INDEX DOCD: CPT | Performed by: INTERNAL MEDICINE

## 2021-12-21 PROCEDURE — 90682 RIV4 VACC RECOMBINANT DNA IM: CPT | Performed by: INTERNAL MEDICINE

## 2021-12-21 PROCEDURE — 99396 PREV VISIT EST AGE 40-64: CPT | Performed by: INTERNAL MEDICINE

## 2021-12-21 RX ORDER — TRAZODONE HYDROCHLORIDE 50 MG/1
50 TABLET ORAL
Qty: 30 TABLET | Refills: 5 | Status: SHIPPED | OUTPATIENT
Start: 2021-12-21

## 2021-12-21 RX ORDER — TRAZODONE HYDROCHLORIDE 50 MG/1
50 TABLET ORAL
Qty: 90 TABLET | Refills: 3 | Status: SHIPPED | OUTPATIENT
Start: 2021-12-21 | End: 2022-06-14 | Stop reason: ALTCHOICE

## 2022-01-10 DIAGNOSIS — I10 HYPERTENSION, UNSPECIFIED TYPE: ICD-10-CM

## 2022-01-10 DIAGNOSIS — F32.A DEPRESSION, UNSPECIFIED DEPRESSION TYPE: ICD-10-CM

## 2022-01-10 DIAGNOSIS — M10.9 GOUT, UNSPECIFIED CAUSE, UNSPECIFIED CHRONICITY, UNSPECIFIED SITE: ICD-10-CM

## 2022-01-10 DIAGNOSIS — E78.2 MIXED HYPERLIPIDEMIA: ICD-10-CM

## 2022-01-10 RX ORDER — LISINOPRIL 5 MG/1
TABLET ORAL
Qty: 90 TABLET | Refills: 3 | Status: SHIPPED | OUTPATIENT
Start: 2022-01-10

## 2022-01-10 RX ORDER — SIMVASTATIN 20 MG
TABLET ORAL
Qty: 90 TABLET | Refills: 3 | Status: SHIPPED | OUTPATIENT
Start: 2022-01-10

## 2022-01-10 RX ORDER — CITALOPRAM 40 MG/1
TABLET ORAL
Qty: 90 TABLET | Refills: 3 | Status: SHIPPED | OUTPATIENT
Start: 2022-01-10

## 2022-01-10 RX ORDER — ALLOPURINOL 100 MG/1
TABLET ORAL
Qty: 90 TABLET | Refills: 3 | Status: SHIPPED | OUTPATIENT
Start: 2022-01-10

## 2022-06-14 ENCOUNTER — VBI (OUTPATIENT)
Dept: ADMINISTRATIVE | Facility: OTHER | Age: 52
End: 2022-06-14

## 2022-06-14 ENCOUNTER — OFFICE VISIT (OUTPATIENT)
Dept: FAMILY MEDICINE CLINIC | Facility: CLINIC | Age: 52
End: 2022-06-14
Payer: COMMERCIAL

## 2022-06-14 VITALS
SYSTOLIC BLOOD PRESSURE: 124 MMHG | WEIGHT: 257 LBS | TEMPERATURE: 97.6 F | DIASTOLIC BLOOD PRESSURE: 78 MMHG | HEART RATE: 78 BPM | RESPIRATION RATE: 18 BRPM | BODY MASS INDEX: 34.81 KG/M2 | HEIGHT: 72 IN

## 2022-06-14 DIAGNOSIS — I10 PRIMARY HYPERTENSION: Primary | ICD-10-CM

## 2022-06-14 DIAGNOSIS — E78.2 MIXED HYPERLIPIDEMIA: ICD-10-CM

## 2022-06-14 DIAGNOSIS — K21.9 GASTROESOPHAGEAL REFLUX DISEASE, UNSPECIFIED WHETHER ESOPHAGITIS PRESENT: ICD-10-CM

## 2022-06-14 DIAGNOSIS — Z12.11 COLON CANCER SCREENING: ICD-10-CM

## 2022-06-14 PROBLEM — F32.1 MAJOR DEPRESSIVE DISORDER, SINGLE EPISODE, MODERATE (HCC): Status: RESOLVED | Noted: 2021-06-16 | Resolved: 2022-06-14

## 2022-06-14 PROCEDURE — 3008F BODY MASS INDEX DOCD: CPT | Performed by: INTERNAL MEDICINE

## 2022-06-14 PROCEDURE — 99214 OFFICE O/P EST MOD 30 MIN: CPT | Performed by: INTERNAL MEDICINE

## 2022-06-14 PROCEDURE — 1036F TOBACCO NON-USER: CPT | Performed by: INTERNAL MEDICINE

## 2022-06-14 NOTE — PROGRESS NOTES
Assessment/Plan:         Diagnoses and all orders for this visit:    Primary hypertension  Stable Continue current rx and encouraged exercise lo sodium diet     Colon cancer screening  -     Cologard  Pt agrees to cologard    Mixed hyperlipidemia  -     Lipid panel; Future  -     Comprehensive metabolic panel; Future  Low fat diet and exercise     Gastroesophageal reflux disease, unspecified whether esophagitis present  Stable on ppi encouraged gerd diet and exercise       Rto 6months     BMI Counseling: Body mass index is 34 86 kg/m²  The BMI is above normal  Nutrition recommendations include consuming healthier snacks and increasing intake of lean protein  Exercise recommendations include strength training exercises  Rationale for BMI follow-up plan is due to patient being overweight or obese  Patient ID: Milda Apley is a 46 y o  male  HPI  Pt generally well he has a new position and is happy to be more settled No chest pain or sob He feels he can stop taking Trazadone as stress improved and sleeping better Mood stable and feels well overall Georges Mar sxs stable He has not bee exercising regularly but did ride his bike last weekend       Review of Systems   Constitutional: Negative for chills and fever  HENT: Negative  Eyes: Negative for visual disturbance  Respiratory: Negative for cough and shortness of breath  Cardiovascular: Negative  Gastrointestinal: Negative for abdominal distention and abdominal pain  Genitourinary: Negative  Musculoskeletal: Negative  Neurological: Negative  Psychiatric/Behavioral: Negative for sleep disturbance  The patient is not nervous/anxious          Past Medical History:   Diagnosis Date    GERD (gastroesophageal reflux disease)     Hyperlipidemia     Hypertension     Major depressive disorder, single episode, moderate ([facility] ) 6/16/2021    PER CMS/ICD 10 CODING GUIDELINES - per provider approval     Past Surgical History:   Procedure Laterality Date    NO PAST SURGERIES      VASECTOMY       Social History     Socioeconomic History    Marital status: /Civil Union     Spouse name: Not on file    Number of children: Not on file    Years of education: Not on file    Highest education level: Not on file   Occupational History    Not on file   Tobacco Use    Smoking status: Former Smoker    Smokeless tobacco: Never Used   Vaping Use    Vaping Use: Never used   Substance and Sexual Activity    Alcohol use: Yes     Comment: occassionally    Drug use: No    Sexual activity: Not on file   Other Topics Concern    Not on file   Social History Narrative    Not on file     Social Determinants of Health     Financial Resource Strain: Not on file   Food Insecurity: Not on file   Transportation Needs: Not on file   Physical Activity: Not on file   Stress: Not on file   Social Connections: Not on file   Intimate Partner Violence: Not on file   Housing Stability: Not on file     No Known Allergies      /78   Pulse 78   Temp 97 6 °F (36 4 °C) (Temporal)   Resp 18   Ht 6' (1 829 m)   Wt 117 kg (257 lb)   BMI 34 86 kg/m²          Physical Exam  Vitals reviewed  Constitutional:       General: He is not in acute distress  Appearance: Normal appearance  He is not ill-appearing, toxic-appearing or diaphoretic  HENT:      Head: Normocephalic and atraumatic  Right Ear: External ear normal       Left Ear: External ear normal       Nose: Nose normal       Mouth/Throat:      Mouth: Mucous membranes are dry  Eyes:      General: No scleral icterus  Extraocular Movements: Extraocular movements intact  Conjunctiva/sclera: Conjunctivae normal       Pupils: Pupils are equal, round, and reactive to light  Cardiovascular:      Rate and Rhythm: Normal rate and regular rhythm  Pulses: Normal pulses  Heart sounds: Normal heart sounds  Pulmonary:      Effort: Pulmonary effort is normal       Breath sounds: Normal breath sounds  Abdominal:      General: Bowel sounds are normal  There is no distension  Palpations: Abdomen is soft  Tenderness: There is no abdominal tenderness  Musculoskeletal:      Cervical back: Normal range of motion and neck supple  No rigidity  Right lower leg: No edema  Left lower leg: No edema  Lymphadenopathy:      Cervical: No cervical adenopathy  Skin:     General: Skin is dry  Coloration: Skin is not jaundiced or pale  Neurological:      General: No focal deficit present  Mental Status: He is alert and oriented to person, place, and time  Mental status is at baseline  Psychiatric:         Mood and Affect: Mood normal          Behavior: Behavior normal          Thought Content:  Thought content normal          Judgment: Judgment normal

## 2022-06-18 ENCOUNTER — APPOINTMENT (OUTPATIENT)
Dept: LAB | Facility: MEDICAL CENTER | Age: 52
End: 2022-06-18
Payer: COMMERCIAL

## 2022-06-18 DIAGNOSIS — E78.2 MIXED HYPERLIPIDEMIA: ICD-10-CM

## 2022-06-18 LAB
ALBUMIN SERPL BCP-MCNC: 3.8 G/DL (ref 3.5–5)
ALP SERPL-CCNC: 73 U/L (ref 46–116)
ALT SERPL W P-5'-P-CCNC: 82 U/L (ref 12–78)
ANION GAP SERPL CALCULATED.3IONS-SCNC: 5 MMOL/L (ref 4–13)
AST SERPL W P-5'-P-CCNC: 39 U/L (ref 5–45)
BILIRUB SERPL-MCNC: 0.91 MG/DL (ref 0.2–1)
BUN SERPL-MCNC: 11 MG/DL (ref 5–25)
CALCIUM SERPL-MCNC: 9.2 MG/DL (ref 8.3–10.1)
CHLORIDE SERPL-SCNC: 112 MMOL/L (ref 100–108)
CHOLEST SERPL-MCNC: 115 MG/DL
CO2 SERPL-SCNC: 23 MMOL/L (ref 21–32)
CREAT SERPL-MCNC: 1.09 MG/DL (ref 0.6–1.3)
GFR SERPL CREATININE-BSD FRML MDRD: 77 ML/MIN/1.73SQ M
GLUCOSE P FAST SERPL-MCNC: 99 MG/DL (ref 65–99)
HDLC SERPL-MCNC: 44 MG/DL
LDLC SERPL CALC-MCNC: 55 MG/DL (ref 0–100)
NONHDLC SERPL-MCNC: 71 MG/DL
POTASSIUM SERPL-SCNC: 4.4 MMOL/L (ref 3.5–5.3)
PROT SERPL-MCNC: 7.4 G/DL (ref 6.4–8.2)
SODIUM SERPL-SCNC: 140 MMOL/L (ref 136–145)
TRIGL SERPL-MCNC: 80 MG/DL

## 2022-06-18 PROCEDURE — 36415 COLL VENOUS BLD VENIPUNCTURE: CPT

## 2022-06-18 PROCEDURE — 80053 COMPREHEN METABOLIC PANEL: CPT

## 2022-06-18 PROCEDURE — 80061 LIPID PANEL: CPT

## 2022-06-22 LAB — COLOGUARD RESULT REPORTABLE: NEGATIVE

## 2022-10-04 ENCOUNTER — IMMUNIZATIONS (OUTPATIENT)
Dept: FAMILY MEDICINE CLINIC | Facility: CLINIC | Age: 52
End: 2022-10-04
Payer: COMMERCIAL

## 2022-10-04 DIAGNOSIS — Z23 NEED FOR IMMUNIZATION AGAINST INFLUENZA: Primary | ICD-10-CM

## 2022-10-04 PROCEDURE — 90471 IMMUNIZATION ADMIN: CPT | Performed by: INTERNAL MEDICINE

## 2022-10-04 PROCEDURE — 90682 RIV4 VACC RECOMBINANT DNA IM: CPT | Performed by: INTERNAL MEDICINE

## 2022-10-11 PROBLEM — Z12.11 COLON CANCER SCREENING: Status: RESOLVED | Noted: 2020-11-16 | Resolved: 2022-10-11

## 2022-10-19 ENCOUNTER — VBI (OUTPATIENT)
Dept: ADMINISTRATIVE | Facility: OTHER | Age: 52
End: 2022-10-19

## 2022-12-01 DIAGNOSIS — K21.9 GASTROESOPHAGEAL REFLUX DISEASE: ICD-10-CM

## 2022-12-01 RX ORDER — OMEPRAZOLE 20 MG/1
CAPSULE, DELAYED RELEASE ORAL
Qty: 90 CAPSULE | Refills: 3 | Status: SHIPPED | OUTPATIENT
Start: 2022-12-01

## 2022-12-07 ENCOUNTER — RA CDI HCC (OUTPATIENT)
Dept: OTHER | Facility: HOSPITAL | Age: 52
End: 2022-12-07

## 2022-12-07 NOTE — PROGRESS NOTES
[facility]  coding opportunities       Chart reviewed, no opportunity found: CHART REVIEWED, NO OPPORTUNITY FOUND        Patients Insurance        Commercial Insurance: [address] [place] [address]

## 2022-12-14 ENCOUNTER — OFFICE VISIT (OUTPATIENT)
Dept: FAMILY MEDICINE CLINIC | Facility: CLINIC | Age: 52
End: 2022-12-14

## 2022-12-14 VITALS
HEIGHT: 72 IN | DIASTOLIC BLOOD PRESSURE: 70 MMHG | TEMPERATURE: 97.5 F | WEIGHT: 252 LBS | BODY MASS INDEX: 34.13 KG/M2 | RESPIRATION RATE: 18 BRPM | SYSTOLIC BLOOD PRESSURE: 124 MMHG | HEART RATE: 78 BPM

## 2022-12-14 DIAGNOSIS — I10 PRIMARY HYPERTENSION: Primary | ICD-10-CM

## 2022-12-14 DIAGNOSIS — F41.9 ANXIETY: ICD-10-CM

## 2022-12-14 DIAGNOSIS — Z12.5 SCREENING FOR PROSTATE CANCER: ICD-10-CM

## 2022-12-14 DIAGNOSIS — E78.2 MIXED HYPERLIPIDEMIA: ICD-10-CM

## 2022-12-14 NOTE — PROGRESS NOTES
Name: Jordy Clark      : 1970      MRN: [id number]  Encounter Provider: Fitz Renteria DO  Encounter Date: 2022   Encounter department: [address] [place] [address] Road     1  Primary hypertension  No added salt diet Increase water intake Exercise encouraged     2  Mixed hyperlipidemia  Low fat diet Recheck labs prior to next visit     3  Anxiety  Stable on current rx and feels doing generally well     6months/annual    Depression Screening and Follow-up Plan: Patient was screened for depression during today's encounter  They screened negative with a PHQ-2 score of 0  Subjective      HPI   Pt generally well He is doing well with the work transition No chest pain or sob He has not been exercising and is aware of weight gain which seems diet related No gerd sxs but takes ppi daily He feels he has been doing well on current meds for depression and is sleeping ok with trazadone  Review of Systems   Constitutional: Negative for chills and fever  HENT: Negative  Eyes: Negative for visual disturbance  Respiratory: Negative for cough and shortness of breath  Cardiovascular: Negative for chest pain, palpitations and leg swelling  Gastrointestinal: Negative for abdominal distention and abdominal pain  Genitourinary: Negative  Musculoskeletal: Negative  Neurological: Negative for dizziness, light-headedness and headaches  Psychiatric/Behavioral: Negative for sleep disturbance  The patient is not nervous/anxious          Current Outpatient Medications on File Prior to Visit   Medication Sig   • allopurinol (ZYLOPRIM) 100 mg tablet TAKE 1 TABLET DAILY   • b complex vitamins capsule Take 1 capsule by mouth daily   • Cholecalciferol (VITAMIN D PO) Take 1 tablet by mouth daily   • citalopram (CeleXA) 40 mg tablet TAKE 1 TABLET DAILY   • lisinopril (ZESTRIL) 5 mg tablet TAKE 1 TABLET DAILY   • omeprazole (PriLOSEC) 20 mg delayed release capsule TAKE 1 CAPSULE DAILY   • simvastatin (ZOCOR) 20 mg tablet TAKE 1 TABLET DAILY   • traZODone (DESYREL) 50 mg tablet Take 1 tablet (50 mg total) by mouth daily at bedtime   • zinc gluconate 50 mg tablet Take 50 mg by mouth daily   • [DISCONTINUED] multivitamin (THERAGRAN) TABS Take 1 tablet by mouth daily (Patient not taking: Reported on 6/14/2022)       Objective     /70   Pulse 78   Temp 97 5 °F (36 4 °C) (Temporal)   Resp 18   Ht 6' (1 829 m)   Wt 114 kg (252 lb)   BMI 34 18 kg/m²     Physical Exam  Vitals reviewed  Constitutional:       General: He is not in acute distress  Appearance: Normal appearance  He is not ill-appearing, toxic-appearing or diaphoretic  HENT:      Head: Normocephalic and atraumatic  Right Ear: External ear normal       Left Ear: External ear normal       Nose: Nose normal       Mouth/Throat:      Mouth: Mucous membranes are dry  Eyes:      General: No scleral icterus  Extraocular Movements: Extraocular movements intact  Conjunctiva/sclera: Conjunctivae normal       Pupils: Pupils are equal, round, and reactive to light  Cardiovascular:      Rate and Rhythm: Normal rate and regular rhythm  Pulses: Normal pulses  Heart sounds: Normal heart sounds  No murmur heard  Pulmonary:      Effort: Pulmonary effort is normal  No respiratory distress  Breath sounds: Normal breath sounds  No wheezing  Abdominal:      General: Bowel sounds are normal  There is no distension  Palpations: Abdomen is soft  Tenderness: There is no abdominal tenderness  Musculoskeletal:      Cervical back: Normal range of motion and neck supple  No rigidity  Right lower leg: No edema  Left lower leg: No edema  Lymphadenopathy:      Cervical: No cervical adenopathy  Skin:     General: Skin is dry  Coloration: Skin is not jaundiced  Neurological:      General: No focal deficit present  Mental Status: He is alert and oriented to person, place, and time   Mental status is at baseline  Psychiatric:         Mood and Affect: Mood normal          Behavior: Behavior normal          Thought Content:  Thought content normal          Judgment: Judgment normal        Chaim Griffiths DO

## 2022-12-24 ENCOUNTER — APPOINTMENT (OUTPATIENT)
Dept: LAB | Facility: MEDICAL CENTER | Age: 52
End: 2022-12-24

## 2022-12-24 DIAGNOSIS — E78.2 MIXED HYPERLIPIDEMIA: ICD-10-CM

## 2022-12-24 DIAGNOSIS — Z12.5 SCREENING FOR PROSTATE CANCER: ICD-10-CM

## 2022-12-24 DIAGNOSIS — I10 PRIMARY HYPERTENSION: ICD-10-CM

## 2022-12-24 LAB
ALBUMIN SERPL BCP-MCNC: 4.1 G/DL (ref 3.5–5)
ALP SERPL-CCNC: 82 U/L (ref 46–116)
ALT SERPL W P-5'-P-CCNC: 59 U/L (ref 12–78)
ANION GAP SERPL CALCULATED.3IONS-SCNC: 8 MMOL/L (ref 4–13)
AST SERPL W P-5'-P-CCNC: 33 U/L (ref 5–45)
BILIRUB SERPL-MCNC: 1.36 MG/DL (ref 0.2–1)
BUN SERPL-MCNC: 12 MG/DL (ref 5–25)
CALCIUM SERPL-MCNC: 9.4 MG/DL (ref 8.3–10.1)
CHLORIDE SERPL-SCNC: 110 MMOL/L (ref 96–108)
CHOLEST SERPL-MCNC: 115 MG/DL
CO2 SERPL-SCNC: 23 MMOL/L (ref 21–32)
CREAT SERPL-MCNC: 1 MG/DL (ref 0.6–1.3)
GFR SERPL CREATININE-BSD FRML MDRD: 84 ML/MIN/1.73SQ M
GLUCOSE P FAST SERPL-MCNC: 108 MG/DL (ref 65–99)
HDLC SERPL-MCNC: 47 MG/DL
LDLC SERPL CALC-MCNC: 53 MG/DL (ref 0–100)
NONHDLC SERPL-MCNC: 68 MG/DL
POTASSIUM SERPL-SCNC: 4.2 MMOL/L (ref 3.5–5.3)
PROT SERPL-MCNC: 7.7 G/DL (ref 6.4–8.4)
PSA SERPL-MCNC: 0.6 NG/ML (ref 0–4)
SODIUM SERPL-SCNC: 141 MMOL/L (ref 135–147)
TRIGL SERPL-MCNC: 74 MG/DL

## 2023-01-09 DIAGNOSIS — F32.A DEPRESSION, UNSPECIFIED DEPRESSION TYPE: ICD-10-CM

## 2023-01-09 DIAGNOSIS — E78.2 MIXED HYPERLIPIDEMIA: ICD-10-CM

## 2023-01-09 DIAGNOSIS — I10 HYPERTENSION, UNSPECIFIED TYPE: ICD-10-CM

## 2023-01-09 DIAGNOSIS — M10.9 GOUT, UNSPECIFIED CAUSE, UNSPECIFIED CHRONICITY, UNSPECIFIED SITE: ICD-10-CM

## 2023-01-09 RX ORDER — CITALOPRAM 40 MG/1
TABLET ORAL
Qty: 90 TABLET | Refills: 3 | Status: SHIPPED | OUTPATIENT
Start: 2023-01-09

## 2023-01-09 RX ORDER — LISINOPRIL 5 MG/1
TABLET ORAL
Qty: 90 TABLET | Refills: 3 | Status: SHIPPED | OUTPATIENT
Start: 2023-01-09

## 2023-01-09 RX ORDER — ALLOPURINOL 100 MG/1
TABLET ORAL
Qty: 90 TABLET | Refills: 3 | Status: SHIPPED | OUTPATIENT
Start: 2023-01-09

## 2023-01-09 RX ORDER — SIMVASTATIN 20 MG
TABLET ORAL
Qty: 90 TABLET | Refills: 3 | Status: SHIPPED | OUTPATIENT
Start: 2023-01-09

## 2023-01-18 ENCOUNTER — TELEPHONE (OUTPATIENT)
Dept: FAMILY MEDICINE CLINIC | Facility: CLINIC | Age: 53
End: 2023-01-18

## 2023-01-18 DIAGNOSIS — U07.1 COVID-19: Primary | ICD-10-CM

## 2023-01-18 RX ORDER — NIRMATRELVIR AND RITONAVIR 300-100 MG
3 KIT ORAL 2 TIMES DAILY
Qty: 30 TABLET | Refills: 0 | Status: SHIPPED | OUTPATIENT
Start: 2023-01-18 | End: 2023-01-23

## 2023-01-18 NOTE — TELEPHONE ENCOUNTER
Tested + for covid today  No real bad symptoms yet  Is willing to try paxlovid if he qualifies  Uses [facility]

## 2023-01-18 NOTE — TELEPHONE ENCOUNTER
Pt aware, is not going to  Paxlovid rx due to possible rebound effect  Advised to call back if sxs worsen/ continue

## 2023-01-18 NOTE — TELEPHONE ENCOUNTER
I sent rx he should be aware he may get rebound effect meaning recurrent sxs 1-2 weeks following and if he does needs to reisolate for 5- 10 days depending on how long symptoms present  He has to hold his cholesterol medication for 10 days once on Rx  I got a popup that it may not be available at [facility] so should check with pharmacy before someone goes to

## 2023-06-08 ENCOUNTER — RA CDI HCC (OUTPATIENT)
Dept: OTHER | Facility: HOSPITAL | Age: 53
End: 2023-06-08

## 2023-06-08 NOTE — PROGRESS NOTES
[facility]  coding opportunities       Chart reviewed, no opportunity found: CHART REVIEWED, NO OPPORTUNITY FOUND        Patients Insurance        Commercial Insurance: [address] [place] [address]

## 2023-06-15 ENCOUNTER — OFFICE VISIT (OUTPATIENT)
Dept: FAMILY MEDICINE CLINIC | Facility: CLINIC | Age: 53
End: 2023-06-15
Payer: COMMERCIAL

## 2023-06-15 VITALS
HEIGHT: 72 IN | SYSTOLIC BLOOD PRESSURE: 130 MMHG | BODY MASS INDEX: 34.29 KG/M2 | HEART RATE: 76 BPM | WEIGHT: 253.2 LBS | TEMPERATURE: 97.4 F | DIASTOLIC BLOOD PRESSURE: 78 MMHG | RESPIRATION RATE: 18 BRPM

## 2023-06-15 DIAGNOSIS — Z00.00 ANNUAL PHYSICAL EXAM: Primary | ICD-10-CM

## 2023-06-15 DIAGNOSIS — E78.2 MIXED HYPERLIPIDEMIA: ICD-10-CM

## 2023-06-15 PROCEDURE — 99396 PREV VISIT EST AGE 40-64: CPT | Performed by: INTERNAL MEDICINE

## 2023-06-15 RX ORDER — RIBOFLAVIN (VITAMIN B2) 100 MG
100 TABLET ORAL DAILY
COMMUNITY

## 2023-06-15 NOTE — PROGRESS NOTES
[facility]    NAME: Drew Aguilar  AGE: 45 y o  SEX: male  : 1970     DATE: 6/15/2023     Assessment and Plan:     Problem List Items Addressed This Visit        Other    Hyperlipidemia    Relevant Orders    Comprehensive metabolic panel    Lipid panel    Annual physical exam - Primary   Rx fbw   Encouraged exercise Low fat diet Stay hydrated  Continue current rx  Annual/prn    Immunizations and preventive care screenings were discussed with patient today  Appropriate education was printed on patient's after visit summary  Counseling:  · Exercise: the importance of regular exercise/physical activity was discussed  Recommend exercise 3-5 times per week for at least 30 minutes  BMI Counseling: Body mass index is 34 34 kg/m²  The BMI is above normal  Nutrition recommendations include consuming healthier snacks and increasing intake of lean protein  Exercise recommendations include exercising 3-5 times per week  Rationale for BMI follow-up plan is due to patient being overweight or obese  Depression Screening and Follow-up Plan: Patient was screened for depression during today's encounter  They screened negative with a PHQ-2 score of 0  Return in about 1 year (around 6/15/2024), or if symptoms worsen or fail to improve, for Recheck  Chief Complaint:     Chief Complaint   Patient presents with   • Annual Exam      History of Present Illness:     Adult Annual Physical   Patient here for a comprehensive physical exam  The patient reports problems - Annual PE Generally well   Diet and Physical Activity  · Diet/Nutrition: well balanced diet  · Exercise: walking        Depression Screening  PHQ-2/9 Depression Screening    Little interest or pleasure in doing things: 0 - not at all  Feeling down, depressed, or hopeless: 0 - not at all  PHQ-2 Score: 0  PHQ-2 Interpretation: Negative depression screen       General Health  · Sleep: gets 7-8 hours of sleep on average  · Hearing: normal - bilateral   · Vision: no vision problems  · Dental: regular dental visits   Health  · Symptoms include: none     Review of Systems:     Review of Systems   Constitutional: Negative for chills and fever  HENT: Negative  Eyes: Negative for visual disturbance  Respiratory: Negative for cough and shortness of breath  Cardiovascular: Negative for chest pain, palpitations and leg swelling  Gastrointestinal: Negative for abdominal distention and abdominal pain  Genitourinary: Negative  Musculoskeletal: Negative  Neurological: Negative for dizziness, light-headedness and headaches  Psychiatric/Behavioral: Negative for sleep disturbance  The patient is not nervous/anxious         Past Medical History:     Past Medical History:   Diagnosis Date   • GERD (gastroesophageal reflux disease)    • Hyperlipidemia    • Hypertension    • Major depressive disorder, single episode, [facility] ([facility] ) 6/16/2021    PER CMS/ICD 10 CODING GUIDELINES - per provider approval      Past Surgical History:     Past Surgical History:   Procedure Laterality Date   • NO PAST SURGERIES     • VASECTOMY        Family History:     Family History   Problem Relation Age of Onset   • Breast cancer Mother    • Diabetes Father    • Lymphoma Father         Non-Hodgkin's lymphoma       Social History:     Social History     Socioeconomic History   • Marital status: /Civil Union     Spouse name: None   • Number of children: None   • Years of education: None   • Highest education level: None   Occupational History   • None   Tobacco Use   • Smoking status: Former   • Smokeless tobacco: Never   Vaping Use   • Vaping Use: Never used   Substance and Sexual Activity   • Alcohol use: Yes     Comment: occassionally   • Drug use: No   • Sexual activity: None   Other Topics Concern   • None   Social History Narrative   • None     Social Determinants of Health Financial Resource Strain: Not on file   Food Insecurity: Not on file   Transportation Needs: Not on file   Physical Activity: Not on file   Stress: Not on file   Social Connections: Not on file   Intimate Partner Violence: Not on file   Housing Stability: Not on file      Current Medications:     Current Outpatient Medications   Medication Sig Dispense Refill   • allopurinol (ZYLOPRIM) 100 mg tablet TAKE 1 TABLET DAILY 90 tablet 3   • Ascorbic Acid (vitamin C) 100 MG tablet Take 100 mg by mouth daily     • b complex vitamins capsule Take 1 capsule by mouth daily     • Cholecalciferol (VITAMIN D PO) Take 1 tablet by mouth daily     • citalopram (CeleXA) 40 mg tablet TAKE 1 TABLET DAILY 90 tablet 3   • lisinopril (ZESTRIL) 5 mg tablet TAKE 1 TABLET DAILY 90 tablet 3   • omeprazole (PriLOSEC) 20 mg delayed release capsule TAKE 1 CAPSULE DAILY 90 capsule 3   • simvastatin (ZOCOR) 20 mg tablet TAKE 1 TABLET DAILY 90 tablet 3   • zinc gluconate 50 mg tablet Take 50 mg by mouth daily       No current facility-administered medications for this visit  Allergies:     No Known Allergies   Physical Exam:     /78   Pulse 76   Temp (!) 97 4 °F (36 3 °C) (Temporal)   Resp 18   Ht 6' (1 829 m)   Wt 115 kg (253 lb 3 2 oz)   BMI 34 34 kg/m²     Physical Exam  Vitals reviewed  Constitutional:       General: He is not in acute distress  Appearance: Normal appearance  He is not ill-appearing, toxic-appearing or diaphoretic  HENT:      Head: Normocephalic and atraumatic  Right Ear: External ear normal       Left Ear: External ear normal       Nose: Nose normal       Mouth/Throat:      Mouth: Mucous membranes are moist    Eyes:      General: No scleral icterus  Extraocular Movements: Extraocular movements intact  Conjunctiva/sclera: Conjunctivae normal       Pupils: Pupils are equal, round, and reactive to light  Cardiovascular:      Rate and Rhythm: Normal rate and regular rhythm  Pulses: Normal pulses  Heart sounds: Normal heart sounds  No murmur heard  Pulmonary:      Effort: Pulmonary effort is normal  No respiratory distress  Breath sounds: Normal breath sounds  No wheezing  Abdominal:      General: Bowel sounds are normal  There is no distension  Palpations: Abdomen is soft  Tenderness: There is no abdominal tenderness  Musculoskeletal:      Cervical back: Normal range of motion and neck supple  Right lower leg: No edema  Left lower leg: No edema  Lymphadenopathy:      Cervical: No cervical adenopathy  Skin:     General: Skin is warm and dry  Coloration: Skin is not jaundiced or pale  Neurological:      General: No focal deficit present  Mental Status: He is alert and oriented to person, place, and time  Mental status is at baseline  Psychiatric:         Mood and Affect: Mood normal          Behavior: Behavior normal          Thought Content:  Thought content normal          Judgment: Judgment normal           Vida Lights, DO  [address]

## 2023-07-08 ENCOUNTER — APPOINTMENT (OUTPATIENT)
Dept: LAB | Facility: MEDICAL CENTER | Age: 53
End: 2023-07-08
Payer: COMMERCIAL

## 2023-07-08 DIAGNOSIS — E78.2 MIXED HYPERLIPIDEMIA: ICD-10-CM

## 2023-07-08 LAB
ALBUMIN SERPL BCP-MCNC: 3.9 G/DL (ref 3.5–5)
ALP SERPL-CCNC: 75 U/L (ref 46–116)
ALT SERPL W P-5'-P-CCNC: 103 U/L (ref 12–78)
ANION GAP SERPL CALCULATED.3IONS-SCNC: 4 MMOL/L
AST SERPL W P-5'-P-CCNC: 65 U/L (ref 5–45)
BILIRUB SERPL-MCNC: 2.03 MG/DL (ref 0.2–1)
BUN SERPL-MCNC: 14 MG/DL (ref 5–25)
CALCIUM SERPL-MCNC: 9.1 MG/DL (ref 8.3–10.1)
CHLORIDE SERPL-SCNC: 109 MMOL/L (ref 96–108)
CHOLEST SERPL-MCNC: 153 MG/DL
CO2 SERPL-SCNC: 26 MMOL/L (ref 21–32)
CREAT SERPL-MCNC: 1.02 MG/DL (ref 0.6–1.3)
GFR SERPL CREATININE-BSD FRML MDRD: 82 ML/MIN/1.73SQ M
GLUCOSE P FAST SERPL-MCNC: 95 MG/DL (ref 65–99)
HDLC SERPL-MCNC: 45 MG/DL
LDLC SERPL CALC-MCNC: 67 MG/DL (ref 0–100)
NONHDLC SERPL-MCNC: 108 MG/DL
POTASSIUM SERPL-SCNC: 4.1 MMOL/L (ref 3.5–5.3)
PROT SERPL-MCNC: 7.3 G/DL (ref 6.4–8.4)
SODIUM SERPL-SCNC: 139 MMOL/L (ref 135–147)
TRIGL SERPL-MCNC: 204 MG/DL

## 2023-07-08 PROCEDURE — 36415 COLL VENOUS BLD VENIPUNCTURE: CPT

## 2023-07-08 PROCEDURE — 80061 LIPID PANEL: CPT

## 2023-07-08 PROCEDURE — 80053 COMPREHEN METABOLIC PANEL: CPT

## 2023-12-07 ENCOUNTER — OFFICE VISIT (OUTPATIENT)
Dept: FAMILY MEDICINE CLINIC | Facility: CLINIC | Age: 53
End: 2023-12-07
Payer: COMMERCIAL

## 2023-12-07 VITALS
TEMPERATURE: 97.5 F | HEIGHT: 72 IN | RESPIRATION RATE: 18 BRPM | DIASTOLIC BLOOD PRESSURE: 76 MMHG | WEIGHT: 256 LBS | SYSTOLIC BLOOD PRESSURE: 128 MMHG | BODY MASS INDEX: 34.67 KG/M2 | OXYGEN SATURATION: 97 % | HEART RATE: 78 BPM

## 2023-12-07 DIAGNOSIS — J06.9 URI WITH COUGH AND CONGESTION: Primary | ICD-10-CM

## 2023-12-07 PROCEDURE — 99213 OFFICE O/P EST LOW 20 MIN: CPT | Performed by: INTERNAL MEDICINE

## 2023-12-07 PROCEDURE — 87635 SARS-COV-2 COVID-19 AMP PRB: CPT | Performed by: INTERNAL MEDICINE

## 2023-12-07 RX ORDER — AMOXICILLIN 500 MG/1
500 CAPSULE ORAL EVERY 8 HOURS SCHEDULED
Qty: 21 CAPSULE | Refills: 0 | Status: SHIPPED | OUTPATIENT
Start: 2023-12-07 | End: 2023-12-14

## 2023-12-07 NOTE — PROGRESS NOTES
Name: Laya Aguirre      : 1970      MRN: [id number]  Encounter Provider: Maura Puri DO  Encounter Date: 2023   Encounter department: [facility]    Assessment & Plan     1. URI with cough and congestion  -     COVID Only- Office Collect  -     amoxicillin (AMOXIL) 500 mg capsule; Take 1 capsule (500 mg total) by mouth every 8 (eight) hours for 7 days  Increase fluids Rest - recommended work from home tomorrow  Covid swab pending Stay hydrated Flonase daily with mucinex Can start antibx if swab negative            Subjective    Pt with sxs for several days and several people at work with similar sxs Had significant headache and sore throat at start Dry cough Uncusre if fever Feels more tired His daughter started with cough last pm  Sore Throat   This is a recurrent problem. The current episode started in the past 7 days. The problem has been gradually worsening. Neither side of throat is experiencing more pain than the other. The pain is at a severity of 6/10. Associated symptoms include congestion, coughing, ear pain, headaches, a hoarse voice, shortness of breath, stridor and trouble swallowing. Pertinent negatives include no abdominal pain, diarrhea, drooling, ear discharge, plugged ear sensation, neck pain, swollen glands or vomiting. He has had exposure to strep. He has had no exposure to mono. Review of Systems   HENT:  Positive for congestion, ear pain, hoarse voice, sore throat and trouble swallowing. Negative for drooling and ear discharge. Respiratory:  Positive for cough, shortness of breath and stridor. Gastrointestinal:  Negative for abdominal pain, diarrhea and vomiting. Musculoskeletal:  Negative for neck pain. Neurological:  Positive for headaches. Psychiatric/Behavioral:  Negative for sleep disturbance.         Current Outpatient Medications on File Prior to Visit   Medication Sig    allopurinol (ZYLOPRIM) 100 mg tablet TAKE 1 TABLET DAILY    Ascorbic Acid (vitamin C) 100 MG tablet Take 100 mg by mouth daily    b complex vitamins capsule Take 1 capsule by mouth daily    Cholecalciferol (VITAMIN D PO) Take 1 tablet by mouth daily    citalopram (CeleXA) 40 mg tablet TAKE 1 TABLET DAILY    lisinopril (ZESTRIL) 5 mg tablet TAKE 1 TABLET DAILY    omeprazole (PriLOSEC) 20 mg delayed release capsule TAKE 1 CAPSULE DAILY    simvastatin (ZOCOR) 20 mg tablet TAKE 1 TABLET DAILY    zinc gluconate 50 mg tablet Take 50 mg by mouth daily       Objective     /76   Pulse 78   Temp 97.5 °F (36.4 °C) (Temporal)   Resp 18   Ht 6' (1.829 m)   Wt 116 kg (256 lb)   SpO2 97%   BMI 34.72 kg/m²     Physical Exam  Vitals and nursing note reviewed. Constitutional:       Appearance: Normal appearance. HENT:      Head: Normocephalic and atraumatic. Right Ear: External ear normal.      Left Ear: External ear normal.      Nose: Congestion present. Mouth/Throat:      Mouth: Mucous membranes are moist.   Eyes:      General: No scleral icterus. Cardiovascular:      Rate and Rhythm: Normal rate and regular rhythm. Pulses: Normal pulses. Heart sounds: Normal heart sounds. Pulmonary:      Effort: Pulmonary effort is normal. No respiratory distress. Breath sounds: Normal breath sounds. No wheezing. Abdominal:      General: Bowel sounds are normal.      Palpations: Abdomen is soft. Musculoskeletal:         General: Normal range of motion. Skin:     General: Skin is warm and dry. Coloration: Skin is not jaundiced or pale. Neurological:      General: No focal deficit present. Mental Status: He is alert and oriented to person, place, and time. Mental status is at baseline. Psychiatric:         Mood and Affect: Mood normal.         Behavior: Behavior normal.         Thought Content:  Thought content normal.         Judgment: Judgment normal.       Mili Goodman,

## 2023-12-07 NOTE — PROGRESS NOTES
Answers submitted by the patient for this visit:  Sore Throat Questionnaire (Submitted on 12/6/2023)  Chief Complaint: Sore throat  Chronicity: recurrent  Onset: in the past 7 days  Progression since onset: gradually worsening  Pain worse on: neither  Pain - numeric: 6/10  abdominal pain: No  congestion: Yes  cough: Yes  diarrhea: No  drooling: No  ear discharge: No  ear pain: Yes  headaches: Yes  hoarse voice: Yes  neck pain: No  plugged ear sensation: No  shortness of breath: Yes  stridor: Yes  swollen glands: No  trouble swallowing: Yes  vomiting: No  strep: Yes  mono:  No

## 2023-12-08 LAB — SARS-COV-2 RNA RESP QL NAA+PROBE: NEGATIVE

## 2023-12-29 ENCOUNTER — OFFICE VISIT (OUTPATIENT)
Dept: URGENT CARE | Facility: MEDICAL CENTER | Age: 53
End: 2023-12-29
Payer: COMMERCIAL

## 2023-12-29 ENCOUNTER — TELEPHONE (OUTPATIENT)
Dept: FAMILY MEDICINE CLINIC | Facility: CLINIC | Age: 53
End: 2023-12-29

## 2023-12-29 VITALS
RESPIRATION RATE: 18 BRPM | TEMPERATURE: 100 F | OXYGEN SATURATION: 96 % | SYSTOLIC BLOOD PRESSURE: 124 MMHG | BODY MASS INDEX: 33.91 KG/M2 | HEART RATE: 103 BPM | WEIGHT: 250 LBS | DIASTOLIC BLOOD PRESSURE: 74 MMHG

## 2023-12-29 DIAGNOSIS — J11.1 INFLUENZA: Primary | ICD-10-CM

## 2023-12-29 DIAGNOSIS — J01.00 SUBACUTE MAXILLARY SINUSITIS: Primary | ICD-10-CM

## 2023-12-29 DIAGNOSIS — R05.1 ACUTE COUGH: ICD-10-CM

## 2023-12-29 PROCEDURE — 99212 OFFICE O/P EST SF 10 MIN: CPT

## 2023-12-29 RX ORDER — GUAIFENESIN 600 MG/1
1200 TABLET, EXTENDED RELEASE ORAL EVERY 12 HOURS SCHEDULED
Qty: 45 TABLET | Refills: 0 | Status: SHIPPED | OUTPATIENT
Start: 2023-12-29

## 2023-12-29 RX ORDER — BENZONATATE 100 MG/1
100 CAPSULE ORAL 3 TIMES DAILY PRN
Qty: 20 CAPSULE | Refills: 0 | Status: SHIPPED | OUTPATIENT
Start: 2023-12-29

## 2023-12-29 RX ORDER — AMOXICILLIN 500 MG/1
500 CAPSULE ORAL EVERY 8 HOURS SCHEDULED
Qty: 21 CAPSULE | Refills: 0 | Status: SHIPPED | OUTPATIENT
Start: 2023-12-29 | End: 2024-01-05

## 2023-12-29 RX ORDER — FLUTICASONE PROPIONATE 50 MCG
1 SPRAY, SUSPENSION (ML) NASAL DAILY
Qty: 11.1 ML | Refills: 0 | Status: SHIPPED | OUTPATIENT
Start: 2023-12-29

## 2023-12-29 RX ORDER — OSELTAMIVIR PHOSPHATE 75 MG/1
75 CAPSULE ORAL EVERY 12 HOURS SCHEDULED
Qty: 10 CAPSULE | Refills: 0 | Status: SHIPPED | OUTPATIENT
Start: 2023-12-29 | End: 2024-01-03

## 2023-12-29 NOTE — PROGRESS NOTES
[facility]        NAME: Walter Chopra is a 53 y.o. male  : 1970    MRN: [id number]  DATE: 2023  TIME: 6:52 PM    Assessment and Plan   Influenza [J11.1]  1. Influenza  oseltamivir (TAMIFLU) 75 mg capsule    guaiFENesin (MUCINEX) 600 mg 12 hr tablet    fluticasone (FLONASE) 50 mcg/act nasal spray      2. Acute cough  benzonatate (TESSALON PERLES) 100 mg capsule      Declined influenza testing for verification. No acute signs of bacterial infection noted on exam.       Patient Instructions       Follow up with PCP in 3-5 days.  Proceed to  ER if symptoms worsen.    Chief Complaint     Chief Complaint   Patient presents with   • Cough     Sx started on  taking mucinex, nyquil, tylenol, and motrin.    • Nasal Congestion     Was given ax amoxicillin by pcp today. Only had 1 dose so far   • Fever     Fever 103   • Headache   • Sore Throat         History of Present Illness       Started with coughing Tuesday night. Earlier this month also had a bad cough but had gotten better and started again on Tuesday. He has had a fever of 103 TMAX. Taking mucinex, Nyquil, tylenol  and motrin. Called PCP and started on Amoxil -this AM. Has had x1 dose.       Review of Systems   Review of Systems   Constitutional:  Positive for appetite change, chills and fever. Negative for fatigue.   HENT:  Positive for congestion, nosebleeds, postnasal drip, rhinorrhea and sore throat. Negative for ear pain, sinus pressure, sinus pain and trouble swallowing.    Eyes:  Negative for pain and visual disturbance.   Respiratory:  Positive for cough. Negative for shortness of breath.    Cardiovascular:  Negative for chest pain and palpitations.   Gastrointestinal:  Positive for nausea. Negative for abdominal pain, constipation, diarrhea and vomiting.   Musculoskeletal:  Positive for myalgias. Negative for arthralgias and back pain.   Skin:  Negative for color change and rash.   Neurological:  Positive for dizziness,  light-headedness and headaches.   All other systems reviewed and are negative.        Current Medications       Current Outpatient Medications:   •  allopurinol (ZYLOPRIM) 100 mg tablet, TAKE 1 TABLET DAILY, Disp: 90 tablet, Rfl: 3  •  amoxicillin (AMOXIL) 500 mg capsule, Take 1 capsule (500 mg total) by mouth every 8 (eight) hours for 7 days, Disp: 21 capsule, Rfl: 0  •  Ascorbic Acid (vitamin C) 100 MG tablet, Take 100 mg by mouth daily, Disp: , Rfl:   •  b complex vitamins capsule, Take 1 capsule by mouth daily, Disp: , Rfl:   •  benzonatate (TESSALON PERLES) 100 mg capsule, Take 1 capsule (100 mg total) by mouth 3 (three) times a day as needed for cough, Disp: 20 capsule, Rfl: 0  •  Cholecalciferol (VITAMIN D PO), Take 1 tablet by mouth daily, Disp: , Rfl:   •  citalopram (CeleXA) 40 mg tablet, TAKE 1 TABLET DAILY, Disp: 90 tablet, Rfl: 3  •  fluticasone (FLONASE) 50 mcg/act nasal spray, 1 spray into each nostril daily, Disp: 11.1 mL, Rfl: 0  •  guaiFENesin (MUCINEX) 600 mg 12 hr tablet, Take 2 tablets (1,200 mg total) by mouth every 12 (twelve) hours, Disp: 45 tablet, Rfl: 0  •  lisinopril (ZESTRIL) 5 mg tablet, TAKE 1 TABLET DAILY, Disp: 90 tablet, Rfl: 3  •  omeprazole (PriLOSEC) 20 mg delayed release capsule, TAKE 1 CAPSULE DAILY, Disp: 90 capsule, Rfl: 3  •  oseltamivir (TAMIFLU) 75 mg capsule, Take 1 capsule (75 mg total) by mouth every 12 (twelve) hours for 5 days, Disp: 10 capsule, Rfl: 0  •  simvastatin (ZOCOR) 20 mg tablet, TAKE 1 TABLET DAILY, Disp: 90 tablet, Rfl: 3  •  zinc gluconate 50 mg tablet, Take 50 mg by mouth daily, Disp: , Rfl:     Current Allergies     Allergies as of 12/29/2023   • (No Known Allergies)            The following portions of the patient's history were reviewed and updated as appropriate: allergies, current medications, past family history, past medical history, past social history, past surgical history and problem list.     Past Medical History:   Diagnosis Date   • GERD  (gastroesophageal reflux disease)    • Hyperlipidemia    • Hypertension    • Major depressive disorder, single episode, moderate (HCC) 6/16/2021    PER CMS/ICD 10 CODING GUIDELINES - per provider approval       Past Surgical History:   Procedure Laterality Date   • NO PAST SURGERIES     • VASECTOMY         Family History   Problem Relation Age of Onset   • Breast cancer Mother    • Diabetes Father    • Lymphoma Father         Non-Hodgkin's lymphoma          Medications have been verified.        Objective   /74   Pulse 103   Temp 100 °F (37.8 °C)   Resp 18   Wt 113 kg (250 lb)   SpO2 96%   BMI 33.91 kg/m²        Physical Exam     Physical Exam  Vitals and nursing note reviewed.   Constitutional:       General: He is not in acute distress.     Appearance: Normal appearance. He is normal weight. He is not ill-appearing.   HENT:      Head: Normocephalic and atraumatic.      Right Ear: Tympanic membrane, ear canal and external ear normal.      Left Ear: Tympanic membrane, ear canal and external ear normal.      Nose: Nose normal.      Mouth/Throat:      Mouth: Mucous membranes are moist.      Pharynx: Oropharynx is clear.   Eyes:      Extraocular Movements: Extraocular movements intact.      Conjunctiva/sclera: Conjunctivae normal.      Pupils: Pupils are equal, round, and reactive to light.   Cardiovascular:      Rate and Rhythm: Normal rate and regular rhythm.      Pulses: Normal pulses.      Heart sounds: Normal heart sounds.   Pulmonary:      Effort: Pulmonary effort is normal.      Breath sounds: Normal breath sounds.   Abdominal:      General: Abdomen is flat. Bowel sounds are normal.      Palpations: Abdomen is soft.   Musculoskeletal:         General: Normal range of motion.      Cervical back: Normal range of motion and neck supple.   Skin:     General: Skin is warm.      Capillary Refill: Capillary refill takes less than 2 seconds.   Neurological:      General: No focal deficit present.      Mental  Status: He is alert and oriented to person, place, and time.   Psychiatric:         Mood and Affect: Mood normal.         Behavior: Behavior normal.

## 2023-12-29 NOTE — TELEPHONE ENCOUNTER
-Patient reported having Hx of anemia  Denies overt signs of bleeding, does not know cause of prior anemia   -Will check CBC  Pt c/o fever of 103, cough and congestion the past 3 days.  He is feeling better today but still has yellowish/green mucous/phlegm.  He tested negative for covid twice with the last negative test this morning.  Daughter tested positive over earl.  Please advise    Pharmacy: [facility], [place]

## 2023-12-29 NOTE — PATIENT INSTRUCTIONS
You may take over the counter Tylenol (Acetaminophen) and/or Motrin (Ibuprofen) as needed, as directed on packaging.   Be sure to get plenty of rest, and drinking fluids to remain hydrated.     Over the counter decongestants can be used, only as directed on the box. However if you have any history of cardiac disease or high blood pressure these should be avoided, as we caution the use of these since they can make place strain on your heart and cause increase in blood pressure. It is recommended in lieu of decongestants to use cool mist vaporizer, saline nasal spray to relieve nasal congestion. It is also important to remain hydrated and drink plenty of fluids (avoiding caffeine and alcohol).     Please follow up with your primary provider in the next several days. Should you have any worsening of symptoms, or lack of improvement please be re-evaluated. If needed for significant concerns, consider 911 or ER evaluation.

## 2024-01-01 ENCOUNTER — HOSPITAL ENCOUNTER (EMERGENCY)
Facility: HOSPITAL | Age: 54
Discharge: HOME | End: 2024-01-01
Attending: EMERGENCY MEDICINE
Payer: COMMERCIAL

## 2024-01-01 ENCOUNTER — APPOINTMENT (EMERGENCY)
Dept: RADIOLOGY | Facility: HOSPITAL | Age: 54
End: 2024-01-01
Payer: COMMERCIAL

## 2024-01-01 ENCOUNTER — APPOINTMENT (EMERGENCY)
Dept: CT IMAGING | Facility: HOSPITAL | Age: 54
End: 2024-01-01
Payer: COMMERCIAL

## 2024-01-01 VITALS
HEART RATE: 96 BPM | BODY MASS INDEX: 23.62 KG/M2 | HEIGHT: 70 IN | WEIGHT: 165 LBS | SYSTOLIC BLOOD PRESSURE: 130 MMHG | TEMPERATURE: 97.1 F | DIASTOLIC BLOOD PRESSURE: 70 MMHG | RESPIRATION RATE: 18 BRPM | OXYGEN SATURATION: 94 %

## 2024-01-01 DIAGNOSIS — R68.89 FLU-LIKE SYMPTOMS: ICD-10-CM

## 2024-01-01 DIAGNOSIS — J18.9 PNEUMONIA: Primary | ICD-10-CM

## 2024-01-01 LAB
ALBUMIN SERPL BCP-MCNC: 3.8 G/DL (ref 3.5–5)
ALP SERPL-CCNC: 71 U/L (ref 34–104)
ALT SERPL W P-5'-P-CCNC: 40 U/L (ref 7–52)
ANION GAP SERPL CALCULATED.3IONS-SCNC: 7 MMOL/L
AST SERPL W P-5'-P-CCNC: 39 U/L (ref 13–39)
BASOPHILS # BLD AUTO: 0.02 THOUSANDS/ÂΜL (ref 0–0.1)
BASOPHILS NFR BLD AUTO: 0 % (ref 0–1)
BILIRUB SERPL-MCNC: 1.33 MG/DL (ref 0.2–1)
BUN SERPL-MCNC: 12 MG/DL (ref 5–25)
CALCIUM SERPL-MCNC: 8.8 MG/DL (ref 8.4–10.2)
CHLORIDE SERPL-SCNC: 100 MMOL/L (ref 96–108)
CO2 SERPL-SCNC: 28 MMOL/L (ref 21–32)
CREAT SERPL-MCNC: 0.87 MG/DL (ref 0.6–1.3)
D DIMER PPP FEU-MCNC: 0.78 UG/ML FEU
EOSINOPHIL # BLD AUTO: 0.09 THOUSAND/ÂΜL (ref 0–0.61)
EOSINOPHIL NFR BLD AUTO: 1 % (ref 0–6)
ERYTHROCYTE [DISTWIDTH] IN BLOOD BY AUTOMATED COUNT: 12.2 % (ref 11.6–15.1)
FLUAV RNA RESP QL NAA+PROBE: NEGATIVE
FLUBV RNA RESP QL NAA+PROBE: NEGATIVE
GFR SERPL CREATININE-BSD FRML MDRD: 97 ML/MIN/1.73SQ M
GLUCOSE SERPL-MCNC: 93 MG/DL (ref 65–140)
HCT VFR BLD AUTO: 41 % (ref 36.5–49.3)
HGB BLD-MCNC: 13.7 G/DL (ref 12–17)
IMM GRANULOCYTES # BLD AUTO: 0.06 THOUSAND/UL (ref 0–0.2)
IMM GRANULOCYTES NFR BLD AUTO: 1 % (ref 0–2)
LIPASE SERPL-CCNC: 8 U/L (ref 11–82)
LYMPHOCYTES # BLD AUTO: 1.25 THOUSANDS/ÂΜL (ref 0.6–4.47)
LYMPHOCYTES NFR BLD AUTO: 15 % (ref 14–44)
MAGNESIUM SERPL-MCNC: 1.9 MG/DL (ref 1.9–2.7)
MCH RBC QN AUTO: 30.1 PG (ref 26.8–34.3)
MCHC RBC AUTO-ENTMCNC: 33.4 G/DL (ref 31.4–37.4)
MCV RBC AUTO: 90 FL (ref 82–98)
MONOCYTES # BLD AUTO: 1.04 THOUSAND/ÂΜL (ref 0.17–1.22)
MONOCYTES NFR BLD AUTO: 13 % (ref 4–12)
NEUTROPHILS # BLD AUTO: 5.89 THOUSANDS/ÂΜL (ref 1.85–7.62)
NEUTS SEG NFR BLD AUTO: 70 % (ref 43–75)
NRBC BLD AUTO-RTO: 0 /100 WBCS
PLATELET # BLD AUTO: 231 THOUSANDS/UL (ref 149–390)
PMV BLD AUTO: 10.4 FL (ref 8.9–12.7)
POTASSIUM SERPL-SCNC: 3.8 MMOL/L (ref 3.5–5.3)
PROT SERPL-MCNC: 6.8 G/DL (ref 6.4–8.4)
RBC # BLD AUTO: 4.55 MILLION/UL (ref 3.88–5.62)
RSV RNA RESP QL NAA+PROBE: NEGATIVE
SARS-COV-2 RNA RESP QL NAA+PROBE: NEGATIVE
SODIUM SERPL-SCNC: 135 MMOL/L (ref 135–147)
WBC # BLD AUTO: 8.35 THOUSAND/UL (ref 4.31–10.16)

## 2024-01-01 PROCEDURE — 94640 AIRWAY INHALATION TREATMENT: CPT

## 2024-01-01 PROCEDURE — 0241U HB NFCT DS VIR RESP RNA 4 TRGT: CPT | Performed by: EMERGENCY MEDICINE

## 2024-01-01 PROCEDURE — 36415 COLL VENOUS BLD VENIPUNCTURE: CPT | Performed by: EMERGENCY MEDICINE

## 2024-01-01 PROCEDURE — G1004 CDSM NDSC: HCPCS

## 2024-01-01 PROCEDURE — 71275 CT ANGIOGRAPHY CHEST: CPT

## 2024-01-01 PROCEDURE — 85379 FIBRIN DEGRADATION QUANT: CPT | Performed by: EMERGENCY MEDICINE

## 2024-01-01 PROCEDURE — 83690 ASSAY OF LIPASE: CPT | Performed by: EMERGENCY MEDICINE

## 2024-01-01 PROCEDURE — 96374 THER/PROPH/DIAG INJ IV PUSH: CPT

## 2024-01-01 PROCEDURE — 83735 ASSAY OF MAGNESIUM: CPT | Performed by: EMERGENCY MEDICINE

## 2024-01-01 PROCEDURE — 99284 EMERGENCY DEPT VISIT MOD MDM: CPT

## 2024-01-01 PROCEDURE — 99285 EMERGENCY DEPT VISIT HI MDM: CPT | Performed by: EMERGENCY MEDICINE

## 2024-01-01 PROCEDURE — 71045 X-RAY EXAM CHEST 1 VIEW: CPT

## 2024-01-01 PROCEDURE — 80053 COMPREHEN METABOLIC PANEL: CPT | Performed by: EMERGENCY MEDICINE

## 2024-01-01 PROCEDURE — 85025 COMPLETE CBC W/AUTO DIFF WBC: CPT | Performed by: EMERGENCY MEDICINE

## 2024-01-01 PROCEDURE — 96361 HYDRATE IV INFUSION ADD-ON: CPT

## 2024-01-01 RX ORDER — ALBUTEROL SULFATE 90 UG/1
2 AEROSOL, METERED RESPIRATORY (INHALATION) ONCE
Status: COMPLETED | OUTPATIENT
Start: 2024-01-01 | End: 2024-01-01

## 2024-01-01 RX ORDER — ALBUTEROL SULFATE 2.5 MG/3ML
5 SOLUTION RESPIRATORY (INHALATION) ONCE
Status: COMPLETED | OUTPATIENT
Start: 2024-01-01 | End: 2024-01-01

## 2024-01-01 RX ORDER — AMOXICILLIN AND CLAVULANATE POTASSIUM 875; 125 MG/1; MG/1
1 TABLET, FILM COATED ORAL ONCE
Status: COMPLETED | OUTPATIENT
Start: 2024-01-01 | End: 2024-01-01

## 2024-01-01 RX ORDER — AZITHROMYCIN 250 MG/1
250 TABLET, FILM COATED ORAL EVERY 24 HOURS
Qty: 4 TABLET | Refills: 0 | Status: SHIPPED | OUTPATIENT
Start: 2024-01-01 | End: 2024-01-05

## 2024-01-01 RX ORDER — KETOROLAC TROMETHAMINE 30 MG/ML
15 INJECTION, SOLUTION INTRAMUSCULAR; INTRAVENOUS ONCE
Status: COMPLETED | OUTPATIENT
Start: 2024-01-01 | End: 2024-01-01

## 2024-01-01 RX ORDER — AZITHROMYCIN 250 MG/1
500 TABLET, FILM COATED ORAL ONCE
Status: COMPLETED | OUTPATIENT
Start: 2024-01-01 | End: 2024-01-01

## 2024-01-01 RX ORDER — AMOXICILLIN AND CLAVULANATE POTASSIUM 875; 125 MG/1; MG/1
1 TABLET, FILM COATED ORAL EVERY 12 HOURS
Qty: 14 TABLET | Refills: 0 | Status: SHIPPED | OUTPATIENT
Start: 2024-01-01 | End: 2024-01-08

## 2024-01-01 RX ADMIN — IPRATROPIUM BROMIDE 0.5 MG: 0.5 SOLUTION RESPIRATORY (INHALATION) at 16:08

## 2024-01-01 RX ADMIN — AZITHROMYCIN DIHYDRATE 500 MG: 250 TABLET ORAL at 17:25

## 2024-01-01 RX ADMIN — AMOXICILLIN AND CLAVULANATE POTASSIUM 1 TABLET: 875; 125 TABLET, FILM COATED ORAL at 17:25

## 2024-01-01 RX ADMIN — ALBUTEROL SULFATE 5 MG: 2.5 SOLUTION RESPIRATORY (INHALATION) at 16:08

## 2024-01-01 RX ADMIN — KETOROLAC TROMETHAMINE 15 MG: 30 INJECTION, SOLUTION INTRAMUSCULAR; INTRAVENOUS at 13:18

## 2024-01-01 RX ADMIN — SODIUM CHLORIDE 1000 ML: 0.9 INJECTION, SOLUTION INTRAVENOUS at 13:18

## 2024-01-01 RX ADMIN — IOHEXOL 85 ML: 350 INJECTION, SOLUTION INTRAVENOUS at 15:25

## 2024-01-01 RX ADMIN — ALBUTEROL SULFATE 2 PUFF: 90 AEROSOL, METERED RESPIRATORY (INHALATION) at 17:26

## 2024-01-01 NOTE — Clinical Note
Walter Chopra was seen and treated in our emergency department on 1/1/2024.                Diagnosis: Pneumonia    Walter  may return to work on return date.    He may return on this date: 01/04/2024         If you have any questions or concerns, please don't hesitate to call.      Elizabeth Bhat, DO    ______________________________           _______________          _______________  Hospital Representative                              Date                                Time

## 2024-01-01 NOTE — ED PROVIDER NOTES
History  Chief Complaint   Patient presents with    Flu Symptoms     Pt states that he has been sick for 2+ weeks- Dry non productive cough, fevers, vomiting, congestion, headaches. Was seen at urgent care on . Tamaflu, musinex, amoxicillin last taken this morning      53-year-old male presents for cold symptoms.  Patient has symptoms for about a week, he has been on Tamiflu as well as amoxicillin without relief in his symptoms.  Patient reports headache, congestion, cough, hoarse voice.  He reports posttussive emesis episodes, body aches.  Patient has had fevers intermittently over this timeframe.  Patient reports poor sleep due to coughing.  In room states 2 of morena children are also sick with cold symptoms, 1 was diagnosed with COVID-19 however patient had a negative at home test.        Prior to Admission Medications   Prescriptions Last Dose Informant Patient Reported? Taking?   Ascorbic Acid (vitamin C) 100 MG tablet   Yes No   Sig: Take 100 mg by mouth daily   Cholecalciferol (VITAMIN D PO)  Self Yes No   Sig: Take 1 tablet by mouth daily   allopurinol (ZYLOPRIM) 100 mg tablet   No No   Sig: TAKE 1 TABLET DAILY   amoxicillin (AMOXIL) 500 mg capsule   No No   Sig: Take 1 capsule (500 mg total) by mouth every 8 (eight) hours for 7 days   b complex vitamins capsule  Self Yes No   Sig: Take 1 capsule by mouth daily   benzonatate (TESSALON PERLES) 100 mg capsule   No No   Sig: Take 1 capsule (100 mg total) by mouth 3 (three) times a day as needed for cough   citalopram (CeleXA) 40 mg tablet   No No   Sig: TAKE 1 TABLET DAILY   fluticasone (FLONASE) 50 mcg/act nasal spray   No No   Si spray into each nostril daily   guaiFENesin (MUCINEX) 600 mg 12 hr tablet   No No   Sig: Take 2 tablets (1,200 mg total) by mouth every 12 (twelve) hours   lisinopril (ZESTRIL) 5 mg tablet   No No   Sig: TAKE 1 TABLET DAILY   omeprazole (PriLOSEC) 20 mg delayed release capsule   No No   Sig: TAKE 1 CAPSULE DAILY    oseltamivir (TAMIFLU) 75 mg capsule   No No   Sig: Take 1 capsule (75 mg total) by mouth every 12 (twelve) hours for 5 days   simvastatin (ZOCOR) 20 mg tablet   No No   Sig: TAKE 1 TABLET DAILY   zinc gluconate 50 mg tablet  Self Yes No   Sig: Take 50 mg by mouth daily      Facility-Administered Medications: None       Past Medical History:   Diagnosis Date    GERD (gastroesophageal reflux disease)     Hyperlipidemia     Hypertension     Major depressive disorder, single episode, moderate (HCC) 6/16/2021    PER CMS/ICD 10 CODING GUIDELINES - per provider approval       Past Surgical History:   Procedure Laterality Date    NO PAST SURGERIES      VASECTOMY         Family History   Problem Relation Age of Onset    Breast cancer Mother     Diabetes Father     Lymphoma Father         Non-Hodgkin's lymphoma      I have reviewed and agree with the history as documented.    E-Cigarette/Vaping    E-Cigarette Use Never User      E-Cigarette/Vaping Substances    Nicotine No     THC No     CBD No     Flavoring No     Other No     Unknown No      Social History     Tobacco Use    Smoking status: Former    Smokeless tobacco: Never   Vaping Use    Vaping status: Never Used   Substance Use Topics    Alcohol use: Yes     Comment: occassionally    Drug use: No       Review of Systems   Constitutional:  Positive for fatigue and fever. Negative for appetite change.   HENT:  Positive for congestion.    Respiratory:  Positive for cough.    Cardiovascular:  Negative for chest pain.   Gastrointestinal:  Positive for nausea. Negative for abdominal pain, diarrhea and vomiting.   Musculoskeletal:  Positive for myalgias.   Neurological:  Positive for headaches.   All other systems reviewed and are negative.      Physical Exam  Physical Exam  Vitals reviewed.   Constitutional:       General: He is not in acute distress.     Appearance: Normal appearance. He is not toxic-appearing.   HENT:      Head: Normocephalic and atraumatic.      Right  Ear: External ear normal.      Left Ear: External ear normal.   Eyes:      General:         Right eye: No discharge.         Left eye: No discharge.      Extraocular Movements: Extraocular movements intact.   Cardiovascular:      Rate and Rhythm: Normal rate and regular rhythm.   Pulmonary:      Effort: Pulmonary effort is normal. No respiratory distress.      Breath sounds: Normal breath sounds. No stridor. No wheezing, rhonchi or rales.   Musculoskeletal:         General: No deformity or signs of injury.   Skin:     General: Skin is warm.      Coloration: Skin is not jaundiced or pale.   Neurological:      General: No focal deficit present.      Mental Status: He is alert.         Vital Signs  ED Triage Vitals [01/01/24 1128]   Temperature Pulse Respirations Blood Pressure SpO2   97.8 °F (36.6 °C) 89 14 138/88 95 %      Temp Source Heart Rate Source Patient Position - Orthostatic VS BP Location FiO2 (%)   Tympanic Monitor Sitting Right arm --      Pain Score       4           Vitals:    01/01/24 1430 01/01/24 1500 01/01/24 1608 01/01/24 1634   BP: 114/75 117/72 126/75 130/70   Pulse: 65 68 71 96   Patient Position - Orthostatic VS: Lying Lying           Visual Acuity      ED Medications  Medications   sodium chloride 0.9 % bolus 1,000 mL (0 mL Intravenous Stopped 1/1/24 1608)   ketorolac (TORADOL) injection 15 mg (15 mg Intravenous Given 1/1/24 1318)   iohexol (OMNIPAQUE) 350 MG/ML injection (SINGLE-DOSE) 85 mL (85 mL Intravenous Given 1/1/24 1525)   albuterol inhalation solution 5 mg (5 mg Nebulization Given 1/1/24 1608)   ipratropium (ATROVENT) 0.02 % inhalation solution 0.5 mg (0.5 mg Nebulization Given 1/1/24 1608)   amoxicillin-clavulanate (AUGMENTIN) 875-125 mg per tablet 1 tablet (1 tablet Oral Given 1/1/24 1725)   azithromycin (ZITHROMAX) tablet 500 mg (500 mg Oral Given 1/1/24 1725)   albuterol (PROVENTIL HFA,VENTOLIN HFA) inhaler 2 puff (2 puffs Inhalation Given 1/1/24 1726)       Diagnostic  Studies  Results Reviewed       Procedure Component Value Units Date/Time    Comprehensive metabolic panel [[id number]]  (Abnormal) Collected: 01/01/24 1352    Lab Status: Final result Specimen: Blood from Arm, Left Updated: 01/01/24 1413     Sodium 135 mmol/L      Potassium 3.8 mmol/L      Chloride 100 mmol/L      CO2 28 mmol/L      ANION GAP 7 mmol/L      BUN 12 mg/dL      Creatinine 0.87 mg/dL      Glucose 93 mg/dL      Calcium 8.8 mg/dL      AST 39 U/L      ALT 40 U/L      Alkaline Phosphatase 71 U/L      Total Protein 6.8 g/dL      Albumin 3.8 g/dL      Total Bilirubin 1.33 mg/dL      eGFR 97 ml/min/1.73sq m     Narrative:      National Kidney Disease Foundation guidelines for Chronic Kidney Disease (CKD):     Stage 1 with normal or high GFR (GFR > 90 mL/min/1.73 square meters)    Stage 2 Mild CKD (GFR = 60-89 mL/min/1.73 square meters)    Stage 3A Moderate CKD (GFR = 45-59 mL/min/1.73 square meters)    Stage 3B Moderate CKD (GFR = 30-44 mL/min/1.73 square meters)    Stage 4 Severe CKD (GFR = 15-29 mL/min/1.73 square meters)    Stage 5 End Stage CKD (GFR <15 mL/min/1.73 square meters)  Note: GFR calculation is accurate only with a steady state creatinine    Lipase [[id number]]  (Abnormal) Collected: 01/01/24 1352    Lab Status: Final result Specimen: Blood from Arm, Left Updated: 01/01/24 1413     Lipase 8 u/L     Magnesium [[id number]]  (Normal) Collected: 01/01/24 1352    Lab Status: Final result Specimen: Blood from Arm, Left Updated: 01/01/24 1413     Magnesium 1.9 mg/dL     FLU/RSV/COVID - if FLU/RSV clinically relevant [[id number]]  (Normal) Collected: 01/01/24 1259    Lab Status: Final result Specimen: Nares from Nose Updated: 01/01/24 1348     SARS-CoV-2 Negative     INFLUENZA A PCR Negative     INFLUENZA B PCR Negative     RSV PCR Negative    Narrative:      FOR PEDIATRIC PATIENTS - copy/paste COVID Guidelines URL to browser:  [email or web address]    SARS-CoV-2 assay is a Nucleic Acid Amplification assay intended for the  qualitative detection of nucleic acid from SARS-CoV-2 in nasopharyngeal  swabs. Results are for the presumptive identification of SARS-CoV-2 RNA.    Positive results are indicative of infection with SARS-CoV-2, the virus  causing COVID-19, but do not rule out bacterial infection or co-infection  with other viruses. Laboratories within the [place] and its  territories are required to report all positive results to the appropriate  public health authorities. Negative results do not preclude SARS-CoV-2  infection and should not be used as the sole basis for treatment or other  patient management decisions. Negative results must be combined with  clinical observations, patient history, and epidemiological information.  This test has not been FDA cleared or approved.    This test has been authorized by FDA under an Emergency Use Authorization  (EUA). This test is only authorized for the duration of time the  declaration that circumstances exist justifying the authorization of the  emergency use of an in vitro diagnostic tests for detection of SARS-CoV-2  virus and/or diagnosis of COVID-19 infection under section 564(b)(1) of  the Act, 21 U.S.C. 360bbb-3(b)(1), unless the authorization is terminated  or revoked sooner. The test has been validated but independent review by FDA  and CLIA is pending.    Test performed using [employer] GeneXpert: This RT-PCR assay targets N2,  a region unique to SARS-CoV-2. A conserved region in the E-gene was chosen  for pan-Sarbecovirus detection which includes SARS-CoV-2.    According to CMS-2020-01-R, this platform meets the definition of high-throughput technology.    D-dimer, quantitative [[id number]]  (Abnormal) Collected: 01/01/24 1259    Lab Status: Final result Specimen: Blood from Arm, Left Updated: 01/01/24 1329     D-Dimer, Quant  0.78 ug/ml FEU     Narrative:      In the evaluation for possible pulmonary embolism, in the appropriate (Well's Score of 4 or less) patient, the age adjusted d-dimer cutoff for this patient can be calculated as:    Age x 0.01 (in ug/mL) for Age-adjusted D-dimer exclusion threshold for a patient over 50 years.    CBC and differential [[id number]]  (Abnormal) Collected: 01/01/24 1259    Lab Status: Final result Specimen: Blood from Arm, Left Updated: 01/01/24 1315     WBC 8.35 Thousand/uL      RBC 4.55 Million/uL      Hemoglobin 13.7 g/dL      Hematocrit 41.0 %      MCV 90 fL      MCH 30.1 pg      MCHC 33.4 g/dL      RDW 12.2 %      MPV 10.4 fL      Platelets 231 Thousands/uL      nRBC 0 /100 WBCs      Neutrophils Relative 70 %      Immat GRANS % 1 %      Lymphocytes Relative 15 %      Monocytes Relative 13 %      Eosinophils Relative 1 %      Basophils Relative 0 %      Neutrophils Absolute 5.89 Thousands/µL      Immature Grans Absolute 0.06 Thousand/uL      Lymphocytes Absolute 1.25 Thousands/µL      Monocytes Absolute 1.04 Thousand/µL      Eosinophils Absolute 0.09 Thousand/µL      Basophils Absolute 0.02 Thousands/µL                    CTA ED chest PE Study   Final Result by Izzy Prater MD (01/01 1700)      No pulmonary embolus.      Extensive bilateral tree-in-bud nodularity with coalescent lower lobe alveolar nodules and diffuse bronchial wall thickening compatible with bronchiolitis, bronchopneumonia, and bronchitis.      Small hiatal hernia.            Workstation performed: [id number]         XR chest 1 view portable    (Results Pending)              Procedures  Procedures         ED Course  ED Course as of 01/01/24 1943   Mon Jan 01, 2024   1351 FLU/RSV/COVID - if FLU/RSV clinically relevant  negative   1425 Comprehensive metabolic panel(!)  Within normal, pt with previous bili elevations   1425 Lipase(!): 8  negative   1425 D-Dimer, Quant(!): 0.78  Updated patient and wife,w ill obtain CTA   1425  Magnesium: 1.9  normal   1425 XR chest 1 view portable  On my interpretation, no pneumonia, possible bronchiolitis appearance   1559 Updated pt to CT showing patchy opacities, will trial breathing tx for bronchodilator effects and monitor oxygen. Currently 89-95%.   1703 CTA ED chest PE Study    IMPRESSION:     No pulmonary embolus.     Extensive bilateral tree-in-bud nodularity with coalescent lower lobe alveolar nodules and diffuse bronchial wall thickening compatible with bronchiolitis, bronchopneumonia, and bronchitis.     Small hiatal hernia.     1718 Patient able to express sputum with breathing tx, reports some improvement. Saturations in 90s. Discussed treatment including abx, albuterol, and d/c home with pulse ox for monitoring.                                SBIRT 20yo+      Flowsheet Row Most Recent Value   Initial Alcohol Screen: US AUDIT-C     1. How often do you have a drink containing alcohol? 0 Filed at: 01/01/2024 1135   2. How many drinks containing alcohol do you have on a typical day you are drinking?  0 Filed at: 01/01/2024 1135   3a. Male UNDER 65: How often do you have five or more drinks on one occasion? 0 Filed at: 01/01/2024 1135   3b. FEMALE Any Age, or MALE 65+: How often do you have 4 or more drinks on one occassion? 0 Filed at: 01/01/2024 1135   Audit-C Score 0 Filed at: 01/01/2024 1135   VERO: How many times in the past year have you...    Used an illegal drug or used a prescription medication for non-medical reasons? Never Filed at: 01/01/2024 1135                      Medical Decision Making  53-year-old male presents for evaluation of cold symptoms, patient overall appears well, he does occasionally dip oxygen saturations into the high 80s on room air however not sustained.  Assess for electrolyte abnormality, anemia, patient is agreeable to a viral panel test today.  Will obtain a D-dimer for screening of VTE given hypoxia episodes, will obtain chest x-ray for pneumonia, pleural  effusions.    Amount and/or Complexity of Data Reviewed  Labs: ordered. Decision-making details documented in ED Course.  Radiology: ordered. Decision-making details documented in ED Course.    Risk  Prescription drug management.             Disposition  Final diagnoses:   Flu-like symptoms   Pneumonia     Time reflects when diagnosis was documented in both MDM as applicable and the Disposition within this note       Time User Action Codes Description Comment    1/1/2024  5:27 PM Hmoe Elizabeth Add [R68.89] Flu-like symptoms     1/1/2024  5:27 PM Capitola Elizabeth Add [J18.9] Pneumonia     1/1/2024  5:27 PM Capitola, Elizabeth Modify [R68.89] Flu-like symptoms     1/1/2024  5:27 PM Capitola, Elizabeth Modify [J18.9] Pneumonia           ED Disposition       ED Disposition   Discharge    Condition   Stable    Date/Time   Mon Jan 1, 2024 1727    Comment   Walter Chopra discharge to home/self care.                   Follow-up Information       Follow up With Specialties Details Why Contact Info Additional Information    Nela Reynoso,  Internal Medicine, Hospice Services In 2 days  [address] [facility] [address]  [phone]       [facility] Emergency Medicine  If symptoms worsen [address] [facility] [address]  [phone] [facility], [address], [place], [address]            Discharge Medication List as of 1/1/2024  5:29 PM        START taking these medications    Details   amoxicillin-clavulanate (AUGMENTIN) 875-125 mg per tablet Take 1 tablet by mouth every 12 (twelve) hours for 7 days, Starting Mon 1/1/2024, Until Mon 1/8/2024, Normal      azithromycin (ZITHROMAX) 250 mg tablet Take 1 tablet (250 mg total) by mouth every 24 hours for 4 days, Starting Mon 1/1/2024, Until Fri 1/5/2024, Normal           CONTINUE these medications which have NOT CHANGED    Details   allopurinol (ZYLOPRIM) 100 mg tablet  TAKE 1 TABLET DAILY, Normal      amoxicillin (AMOXIL) 500 mg capsule Take 1 capsule (500 mg total) by mouth every 8 (eight) hours for 7 days, Starting Fri 12/29/2023, Until Fri 1/5/2024, Normal      Ascorbic Acid (vitamin C) 100 MG tablet Take 100 mg by mouth daily, Historical Med      b complex vitamins capsule Take 1 capsule by mouth daily, Historical Med      benzonatate (TESSALON PERLES) 100 mg capsule Take 1 capsule (100 mg total) by mouth 3 (three) times a day as needed for cough, Starting Fri 12/29/2023, Normal      Cholecalciferol (VITAMIN D PO) Take 1 tablet by mouth daily, Historical Med      citalopram (CeleXA) 40 mg tablet TAKE 1 TABLET DAILY, Normal      fluticasone (FLONASE) 50 mcg/act nasal spray 1 spray into each nostril daily, Starting Fri 12/29/2023, Normal      guaiFENesin (MUCINEX) 600 mg 12 hr tablet Take 2 tablets (1,200 mg total) by mouth every 12 (twelve) hours, Starting Fri 12/29/2023, Normal      lisinopril (ZESTRIL) 5 mg tablet TAKE 1 TABLET DAILY, Normal      omeprazole (PriLOSEC) 20 mg delayed release capsule TAKE 1 CAPSULE DAILY, Normal      oseltamivir (TAMIFLU) 75 mg capsule Take 1 capsule (75 mg total) by mouth every 12 (twelve) hours for 5 days, Starting Fri 12/29/2023, Until Wed 1/3/2024, Normal      simvastatin (ZOCOR) 20 mg tablet TAKE 1 TABLET DAILY, Normal      zinc gluconate 50 mg tablet Take 50 mg by mouth daily, Historical Med             No discharge procedures on file.    PDMP Review       None            ED Provider  Electronically Signed by             Elizabeth Bhat DO  01/01/24 1943

## 2024-01-04 ENCOUNTER — TELEPHONE (OUTPATIENT)
Dept: FAMILY MEDICINE CLINIC | Facility: CLINIC | Age: 54
End: 2024-01-04

## 2024-01-04 NOTE — TELEPHONE ENCOUNTER
Spoke to wife and she will take him to ER if he gets worse and would like to keep appt for 1/5 at 3:20

## 2024-01-04 NOTE — TELEPHONE ENCOUNTER
Only available I have as of now is to add at end of day tomorrow 3:20 unless I have a cancel earlier in day tomorrow   If he is worsening would return to Er as he may need to be admitted for treatment

## 2024-01-04 NOTE — TELEPHONE ENCOUNTER
Zeina called stating Walter is not sleeping,he is constantly coughing and not doing well at all. Actually gagging and has lost 10 lbs in 4 days.  She may try to take him to urgent care but would like to see if an appt is available for tomorrow as well.  Also a nurse friend said to ask for Seraquil or Zyprexa to help him.    Please advise   Thank you    Zeina phone [phone]

## 2024-01-04 NOTE — TELEPHONE ENCOUNTER
I responded to note from Angela already  If she takes him anywhere I would recommend ER not urgent care as he may need admission but offered end of day appt tomorrow

## 2024-01-05 ENCOUNTER — OFFICE VISIT (OUTPATIENT)
Dept: FAMILY MEDICINE CLINIC | Facility: CLINIC | Age: 54
End: 2024-01-05
Payer: COMMERCIAL

## 2024-01-05 VITALS
OXYGEN SATURATION: 95 % | DIASTOLIC BLOOD PRESSURE: 78 MMHG | TEMPERATURE: 97.3 F | HEART RATE: 86 BPM | HEIGHT: 70 IN | WEIGHT: 243.2 LBS | BODY MASS INDEX: 34.82 KG/M2 | SYSTOLIC BLOOD PRESSURE: 128 MMHG | RESPIRATION RATE: 20 BRPM

## 2024-01-05 DIAGNOSIS — J18.9 PNEUMONIA OF LOWER LOBE DUE TO INFECTIOUS ORGANISM, UNSPECIFIED LATERALITY: ICD-10-CM

## 2024-01-05 DIAGNOSIS — E78.2 MIXED HYPERLIPIDEMIA: ICD-10-CM

## 2024-01-05 DIAGNOSIS — J18.9 PNEUMONIA OF LOWER LOBE DUE TO INFECTIOUS ORGANISM, UNSPECIFIED LATERALITY: Primary | ICD-10-CM

## 2024-01-05 DIAGNOSIS — I10 HYPERTENSION, UNSPECIFIED TYPE: ICD-10-CM

## 2024-01-05 DIAGNOSIS — J21.9 BRONCHIOLITIS: ICD-10-CM

## 2024-01-05 DIAGNOSIS — F32.A DEPRESSION, UNSPECIFIED DEPRESSION TYPE: ICD-10-CM

## 2024-01-05 DIAGNOSIS — M10.9 GOUT, UNSPECIFIED CAUSE, UNSPECIFIED CHRONICITY, UNSPECIFIED SITE: ICD-10-CM

## 2024-01-05 LAB
DME PARACHUTE DELIVERY DATE REQUESTED: NORMAL
DME PARACHUTE ITEM DESCRIPTION: NORMAL
DME PARACHUTE ORDER STATUS: NORMAL
DME PARACHUTE SUPPLIER NAME: NORMAL
DME PARACHUTE SUPPLIER PHONE: NORMAL

## 2024-01-05 PROCEDURE — 99214 OFFICE O/P EST MOD 30 MIN: CPT | Performed by: INTERNAL MEDICINE

## 2024-01-05 RX ORDER — PROMETHAZINE HYDROCHLORIDE AND CODEINE PHOSPHATE 6.25; 1 MG/5ML; MG/5ML
5 SYRUP ORAL EVERY 4 HOURS PRN
Qty: 240 ML | Refills: 0 | Status: SHIPPED | OUTPATIENT
Start: 2024-01-05 | End: 2024-01-05

## 2024-01-05 RX ORDER — IPRATROPIUM BROMIDE AND ALBUTEROL SULFATE 2.5; .5 MG/3ML; MG/3ML
3 SOLUTION RESPIRATORY (INHALATION) 4 TIMES DAILY
Qty: 60 ML | Refills: 3 | Status: SHIPPED | OUTPATIENT
Start: 2024-01-05

## 2024-01-05 RX ORDER — CITALOPRAM 40 MG/1
TABLET ORAL
Qty: 90 TABLET | Refills: 3 | Status: SHIPPED | OUTPATIENT
Start: 2024-01-05

## 2024-01-05 RX ORDER — PREDNISONE 20 MG/1
20 TABLET ORAL DAILY
Qty: 5 TABLET | Refills: 0 | Status: SHIPPED | OUTPATIENT
Start: 2024-01-05

## 2024-01-05 RX ORDER — LISINOPRIL 5 MG/1
TABLET ORAL
Qty: 90 TABLET | Refills: 3 | Status: SHIPPED | OUTPATIENT
Start: 2024-01-05

## 2024-01-05 RX ORDER — ALLOPURINOL 100 MG/1
TABLET ORAL
Qty: 90 TABLET | Refills: 3 | Status: SHIPPED | OUTPATIENT
Start: 2024-01-05

## 2024-01-05 RX ORDER — SIMVASTATIN 20 MG
TABLET ORAL
Qty: 90 TABLET | Refills: 3 | Status: SHIPPED | OUTPATIENT
Start: 2024-01-05

## 2024-01-05 NOTE — PROGRESS NOTES
Name: Walter Chopra      : 1970      MRN: [id number]  Encounter Provider: Nela Reynoso DO  Encounter Date: 2024   Encounter department: [facility]    Assessment & Plan     1. Pneumonia of lower lobe due to infectious organism, unspecified laterality  -     ipratropium-albuterol (DUO-NEB) 0.5-2.5 mg/3 mL nebulizer solution; Take 3 mL by nebulization 4 (four) times a day  -     promethazine-codeine (PHENERGAN WITH CODEINE) 6.25-10 mg/5 mL syrup; Take 5 mL by mouth every 4 (four) hours as needed for cough  -     XR chest pa & lateral; Future; Expected date: 2024  -     predniSONE 20 mg tablet; Take 1 tablet (20 mg total) by mouth daily  Repeat cxr 7-10 days ER if sxs decline  We called [facility] to prioritize neb delivery hoping he will have by tomorrow  Stay hydrated Small meals thru the day,light diet     2. Bronchiolitis  -     ipratropium-albuterol (DUO-NEB) 0.5-2.5 mg/3 mL nebulizer solution; Take 3 mL by nebulization 4 (four) times a day  -     promethazine-codeine (PHENERGAN WITH CODEINE) 6.25-10 mg/5 mL syrup; Take 5 mL by mouth every 4 (four) hours as needed for cough  -     predniSONE 20 mg tablet; Take 1 tablet (20 mg total) by mouth daily           Subjective      HPI  Pt dx with flu prior to  then pneumonia He feels tired as he is not sleeping due to cough He finished one antibiotic and has another to complete still No fever Appetite poor No vomiting but weak and nausea Coughing up mucous past few days which is better but feels sob with exertion His daughters were ill over the holiday as well Not sleeping has him frustrated He is taking mucinex the past few days with some benefit Not sleeping is his main complaint He has inhaler but not nebulizer  Review of Systems   Constitutional:  Positive for activity change, appetite change and fatigue. Negative for chills and fever.   HENT:  Positive for congestion and postnasal drip.    Respiratory:  Positive for  "cough and shortness of breath.    Cardiovascular:  Negative for chest pain, palpitations and leg swelling.   Gastrointestinal:  Positive for nausea. Negative for abdominal distention and abdominal pain.   Musculoskeletal:  Positive for gait problem and myalgias.   Neurological:  Positive for weakness and light-headedness. Negative for dizziness and headaches.   Psychiatric/Behavioral:  Positive for sleep disturbance.      Current Outpatient Medications on File Prior to Visit   Medication Sig   • amoxicillin (AMOXIL) 500 mg capsule Take 1 capsule (500 mg total) by mouth every 8 (eight) hours for 7 days   • amoxicillin-clavulanate (AUGMENTIN) 875-125 mg per tablet Take 1 tablet by mouth every 12 (twelve) hours for 7 days   • Ascorbic Acid (vitamin C) 100 MG tablet Take 100 mg by mouth daily   • azithromycin (ZITHROMAX) 250 mg tablet Take 1 tablet (250 mg total) by mouth every 24 hours for 4 days   • b complex vitamins capsule Take 1 capsule by mouth daily   • benzonatate (TESSALON PERLES) 100 mg capsule Take 1 capsule (100 mg total) by mouth 3 (three) times a day as needed for cough   • Cholecalciferol (VITAMIN D PO) Take 1 tablet by mouth daily   • fluticasone (FLONASE) 50 mcg/act nasal spray 1 spray into each nostril daily   • guaiFENesin (MUCINEX) 600 mg 12 hr tablet Take 2 tablets (1,200 mg total) by mouth every 12 (twelve) hours   • omeprazole (PriLOSEC) 20 mg delayed release capsule TAKE 1 CAPSULE DAILY   • zinc gluconate 50 mg tablet Take 50 mg by mouth daily   • [DISCONTINUED] allopurinol (ZYLOPRIM) 100 mg tablet TAKE 1 TABLET DAILY   • [DISCONTINUED] citalopram (CeleXA) 40 mg tablet TAKE 1 TABLET DAILY   • [DISCONTINUED] lisinopril (ZESTRIL) 5 mg tablet TAKE 1 TABLET DAILY   • [DISCONTINUED] simvastatin (ZOCOR) 20 mg tablet TAKE 1 TABLET DAILY       Objective     /78   Pulse 86   Temp (!) 97.3 °F (36.3 °C) (Temporal)   Resp 20   Ht 5' 10\" (1.778 m)   Wt 110 kg (243 lb 3.2 oz)   SpO2 95%   BMI " 34.90 kg/m²     Physical Exam  Vitals and nursing note reviewed.   Constitutional:       General: He is not in acute distress.     Appearance: He is ill-appearing. He is not toxic-appearing or diaphoretic.   HENT:      Head: Normocephalic and atraumatic.      Right Ear: Tympanic membrane and external ear normal.      Left Ear: Tympanic membrane and external ear normal.      Nose: Congestion present.      Mouth/Throat:      Mouth: Mucous membranes are dry.      Pharynx: Oropharyngeal exudate present.   Eyes:      General: No scleral icterus.     Extraocular Movements: Extraocular movements intact.      Conjunctiva/sclera: Conjunctivae normal.      Pupils: Pupils are equal, round, and reactive to light.   Cardiovascular:      Rate and Rhythm: Normal rate and regular rhythm.      Pulses: Normal pulses.   Pulmonary:      Effort: Pulmonary effort is normal.      Breath sounds: Rhonchi present. No rales.      Comments: Decrease bs no wheeze  Abdominal:      General: Bowel sounds are normal. There is no distension.      Palpations: Abdomen is soft.      Tenderness: There is no abdominal tenderness.   Musculoskeletal:      Cervical back: Neck supple.      Right lower leg: No edema.      Left lower leg: No edema.   Lymphadenopathy:      Cervical: No cervical adenopathy.   Skin:     General: Skin is warm and dry.      Coloration: Skin is pale. Skin is not jaundiced.   Neurological:      General: No focal deficit present.      Mental Status: He is alert and oriented to person, place, and time. Mental status is at baseline.      Cranial Nerves: No cranial nerve deficit.   Psychiatric:         Mood and Affect: Mood normal.         Behavior: Behavior normal.         Thought Content: Thought content normal.         Judgment: Judgment normal.   Nela Reynoso DO

## 2024-01-11 LAB
DME PARACHUTE DELIVERY DATE ACTUAL: NORMAL
DME PARACHUTE DELIVERY DATE REQUESTED: NORMAL
DME PARACHUTE ITEM DESCRIPTION: NORMAL
DME PARACHUTE ORDER STATUS: NORMAL
DME PARACHUTE SUPPLIER NAME: NORMAL
DME PARACHUTE SUPPLIER PHONE: NORMAL

## 2024-01-17 ENCOUNTER — HOSPITAL ENCOUNTER (OUTPATIENT)
Dept: RADIOLOGY | Facility: HOSPITAL | Age: 54
Discharge: HOME | End: 2024-01-17
Attending: INTERNAL MEDICINE
Payer: COMMERCIAL

## 2024-01-17 DIAGNOSIS — J18.9 PNEUMONIA OF LOWER LOBE DUE TO INFECTIOUS ORGANISM, UNSPECIFIED LATERALITY: ICD-10-CM

## 2024-01-17 PROCEDURE — 71046 X-RAY EXAM CHEST 2 VIEWS: CPT

## 2024-06-20 ENCOUNTER — RA CDI HCC (OUTPATIENT)
Dept: OTHER | Facility: HOSPITAL | Age: 54
End: 2024-06-20

## 2024-06-20 PROBLEM — Z00.00 ANNUAL PHYSICAL EXAM: Status: RESOLVED | Noted: 2020-11-16 | Resolved: 2024-06-20

## 2024-06-27 ENCOUNTER — OFFICE VISIT (OUTPATIENT)
Dept: FAMILY MEDICINE CLINIC | Facility: CLINIC | Age: 54
End: 2024-06-27
Payer: COMMERCIAL

## 2024-06-27 VITALS
OXYGEN SATURATION: 97 % | DIASTOLIC BLOOD PRESSURE: 78 MMHG | WEIGHT: 254.2 LBS | BODY MASS INDEX: 36.39 KG/M2 | HEIGHT: 70 IN | TEMPERATURE: 97.3 F | SYSTOLIC BLOOD PRESSURE: 124 MMHG | HEART RATE: 65 BPM | RESPIRATION RATE: 18 BRPM

## 2024-06-27 DIAGNOSIS — Z12.5 SCREENING FOR PROSTATE CANCER: ICD-10-CM

## 2024-06-27 DIAGNOSIS — E78.5 HYPERLIPIDEMIA, UNSPECIFIED HYPERLIPIDEMIA TYPE: ICD-10-CM

## 2024-06-27 DIAGNOSIS — Z00.00 ANNUAL PHYSICAL EXAM: Primary | ICD-10-CM

## 2024-06-27 PROCEDURE — 99396 PREV VISIT EST AGE 40-64: CPT | Performed by: INTERNAL MEDICINE

## 2024-06-27 NOTE — PROGRESS NOTES
Adult Annual Physical  Name: Walter Chopra      : 1970      MRN: [id number]  Encounter Provider: Nela Reynoso DO  Encounter Date: 2024   Encounter department: [facility]    Assessment & Plan   1. Annual physical exam  Lo fat diet and exercise encouraged  Stay hydrated  Continue current rx   Labs upcoming   2. Screening for prostate cancer  -     PSA, Total Screen; Future  3. Hyperlipidemia, unspecified hyperlipidemia type  -     Lipid panel; Future  -     Comprehensive metabolic panel; Future    Immunizations and preventive care screenings were discussed with patient today. Appropriate education was printed on patient's after visit summary.    Discussed risks and benefits of prostate cancer screening. We discussed the controversial history of PSA screening for prostate cancer in the [place] as well as the risk of over detection and over treatment of prostate cancer by way of PSA screening.  The patient understands that PSA blood testing is an imperfect way to screen for prostate cancer and that elevated PSA levels in the blood may also be caused by infection, inflammation, prostatic trauma or manipulation, urological procedures, or by benign prostatic enlargement.    The role of the digital rectal examination in prostate cancer screening was also discussed and I discussed with him that there is large interobserver variability in the findings of digital rectal examination.    Counseling:  Exercise: the importance of regular exercise/physical activity was discussed. Recommend exercise 3-5 times per week for at least 30 minutes.          History of Present Illness     Adult Annual Physical:  Patient presents for annual physical.     Diet and Physical Activity:  - Diet/Nutrition: limited junk food.  - Exercise: no formal exercise.    Depression Screening:  - PHQ-2 Score: 0    General Health:  - Sleep: 7-8 hours of sleep on average.  - Hearing: normal hearing bilateral ears.  -  Vision: no vision problems.  - Dental: regular dental visits.     Health:  - History of STDs: no.   - Urinary symptoms: none.     Advanced Care Planning:  - Has an advanced directive?: no    - Has a durable medical POA?: no    - ACP document given to patient?: no      Review of Systems   Constitutional: Negative.    HENT: Negative.     Eyes: Negative.    Respiratory: Negative.     Cardiovascular: Negative.    Gastrointestinal: Negative.    Genitourinary: Negative.    Musculoskeletal: Negative.    Skin: Negative.    Neurological: Negative.    Hematological: Negative.    Psychiatric/Behavioral: Negative.     Past Medical History:   Diagnosis Date   • Annual physical exam 11/16/2020   • GERD (gastroesophageal reflux disease)    • Hyperlipidemia    • Hypertension    • Major depressive disorder, single episode, moderate (HCC) 06/16/2021    PER CMS/ICD 10 CODING GUIDELINES - per provider approval     Past Surgical History:   Procedure Laterality Date   • NO PAST SURGERIES     • VASECTOMY       Social History     Socioeconomic History   • Marital status: /Civil Union     Spouse name: Not on file   • Number of children: Not on file   • Years of education: Not on file   • Highest education level: Not on file   Occupational History   • Not on file   Tobacco Use   • Smoking status: Former   • Smokeless tobacco: Never   Vaping Use   • Vaping status: Never Used   Substance and Sexual Activity   • Alcohol use: Yes     Comment: occassionally   • Drug use: No   • Sexual activity: Not on file   Other Topics Concern   • Not on file   Social History Narrative   • Not on file     Social Determinants of Health     Financial Resource Strain: Not on file   Food Insecurity: Not on file   Transportation Needs: Not on file   Physical Activity: Not on file   Stress: Not on file   Social Connections: Not on file   Intimate Partner Violence: Not on file   Housing Stability: Not on file     No Known Allergies      Objective     BP  "124/78   Pulse 65   Temp (!) 97.3 °F (36.3 °C) (Temporal)   Resp 18   Ht 5' 10\" (1.778 m)   Wt 115 kg (254 lb 3.2 oz)   SpO2 97%   BMI 36.47 kg/m²     Physical Exam  Vitals and nursing note reviewed.   Constitutional:       General: He is not in acute distress.     Appearance: Normal appearance. He is not ill-appearing, toxic-appearing or diaphoretic.   HENT:      Head: Normocephalic and atraumatic.      Right Ear: External ear normal.      Left Ear: External ear normal.      Nose: Nose normal.      Mouth/Throat:      Mouth: Mucous membranes are moist.      Pharynx: No oropharyngeal exudate.   Eyes:      General: No scleral icterus.     Extraocular Movements: Extraocular movements intact.      Conjunctiva/sclera: Conjunctivae normal.      Pupils: Pupils are equal, round, and reactive to light.   Cardiovascular:      Rate and Rhythm: Normal rate and regular rhythm.      Pulses: Normal pulses.      Heart sounds: Normal heart sounds.   Pulmonary:      Effort: Pulmonary effort is normal. No respiratory distress.      Breath sounds: Normal breath sounds. No wheezing.   Abdominal:      General: Bowel sounds are normal. There is no distension.      Palpations: Abdomen is soft.      Tenderness: There is no abdominal tenderness.   Musculoskeletal:         General: No swelling or tenderness. Normal range of motion.      Cervical back: Normal range of motion and neck supple.   Lymphadenopathy:      Cervical: No cervical adenopathy.   Skin:     General: Skin is warm and dry.      Coloration: Skin is not jaundiced or pale.   Neurological:      General: No focal deficit present.      Mental Status: He is alert and oriented to person, place, and time. Mental status is at baseline.      Cranial Nerves: No cranial nerve deficit.      Sensory: No sensory deficit.   Psychiatric:         Mood and Affect: Mood normal.         Behavior: Behavior normal.         Thought Content: Thought content normal.         Judgment: Judgment " normal.   Administrative Statements

## 2024-08-08 ENCOUNTER — APPOINTMENT (OUTPATIENT)
Dept: LAB | Facility: CLINIC | Age: 54
End: 2024-08-08
Payer: COMMERCIAL

## 2024-08-08 DIAGNOSIS — Z12.5 SCREENING FOR PROSTATE CANCER: ICD-10-CM

## 2024-08-08 DIAGNOSIS — E78.5 HYPERLIPIDEMIA, UNSPECIFIED HYPERLIPIDEMIA TYPE: ICD-10-CM

## 2024-08-08 LAB
ALBUMIN SERPL BCG-MCNC: 4.5 G/DL (ref 3.5–5)
ALP SERPL-CCNC: 71 U/L (ref 34–104)
ALT SERPL W P-5'-P-CCNC: 48 U/L (ref 7–52)
ANION GAP SERPL CALCULATED.3IONS-SCNC: 7 MMOL/L (ref 4–13)
AST SERPL W P-5'-P-CCNC: 32 U/L (ref 13–39)
BILIRUB SERPL-MCNC: 0.83 MG/DL (ref 0.2–1)
BUN SERPL-MCNC: 15 MG/DL (ref 5–25)
CALCIUM SERPL-MCNC: 9.9 MG/DL (ref 8.4–10.2)
CHLORIDE SERPL-SCNC: 105 MMOL/L (ref 96–108)
CHOLEST SERPL-MCNC: 141 MG/DL
CO2 SERPL-SCNC: 28 MMOL/L (ref 21–32)
CREAT SERPL-MCNC: 0.98 MG/DL (ref 0.6–1.3)
GFR SERPL CREATININE-BSD FRML MDRD: 86 ML/MIN/1.73SQ M
GLUCOSE P FAST SERPL-MCNC: 96 MG/DL (ref 65–99)
HDLC SERPL-MCNC: 51 MG/DL
LDLC SERPL CALC-MCNC: 65 MG/DL (ref 0–100)
NONHDLC SERPL-MCNC: 90 MG/DL
POTASSIUM SERPL-SCNC: 4.8 MMOL/L (ref 3.5–5.3)
PROT SERPL-MCNC: 7.8 G/DL (ref 6.4–8.4)
PSA SERPL-MCNC: 0.79 NG/ML (ref 0–4)
SODIUM SERPL-SCNC: 140 MMOL/L (ref 135–147)
TRIGL SERPL-MCNC: 127 MG/DL

## 2024-08-08 PROCEDURE — 80053 COMPREHEN METABOLIC PANEL: CPT

## 2024-08-08 PROCEDURE — G0103 PSA SCREENING: HCPCS

## 2024-08-08 PROCEDURE — 36415 COLL VENOUS BLD VENIPUNCTURE: CPT

## 2024-08-08 PROCEDURE — 80061 LIPID PANEL: CPT

## 2024-09-23 ENCOUNTER — OFFICE VISIT (OUTPATIENT)
Dept: CARDIOLOGY CLINIC | Facility: CLINIC | Age: 54
End: 2024-09-23
Payer: COMMERCIAL

## 2024-09-23 VITALS
HEART RATE: 66 BPM | BODY MASS INDEX: 35.7 KG/M2 | SYSTOLIC BLOOD PRESSURE: 136 MMHG | HEIGHT: 71 IN | DIASTOLIC BLOOD PRESSURE: 78 MMHG | RESPIRATION RATE: 16 BRPM | OXYGEN SATURATION: 97 % | WEIGHT: 255 LBS

## 2024-09-23 DIAGNOSIS — I10 PRIMARY HYPERTENSION: ICD-10-CM

## 2024-09-23 DIAGNOSIS — E78.2 MIXED HYPERLIPIDEMIA: ICD-10-CM

## 2024-09-23 DIAGNOSIS — R06.09 DOE (DYSPNEA ON EXERTION): Primary | ICD-10-CM

## 2024-09-23 PROCEDURE — 93000 ELECTROCARDIOGRAM COMPLETE: CPT | Performed by: INTERNAL MEDICINE

## 2024-09-23 PROCEDURE — 99244 OFF/OP CNSLTJ NEW/EST MOD 40: CPT | Performed by: INTERNAL MEDICINE

## 2024-09-23 NOTE — PATIENT INSTRUCTIONS
"Patient Education     High cholesterol   The Basics   Written by the doctors and editors at [facility]   What is cholesterol? -- Cholesterol is a substance found in blood. Everyone has some. It is needed for good health. But people sometimes have too much cholesterol.  Compared with people with normal cholesterol, people with high cholesterol have a higher risk of heart attack, stroke, and other health problems. The higher your cholesterol, the higher your risk of these problems.  Are there different types of cholesterol? -- Yes, there are a few different types. If you get a cholesterol test, you might hear your doctor or nurse talk about:   Total cholesterol   LDL cholesterol - Some people call this the \"bad\" cholesterol. That's because having high LDL levels raises your risk of heart attack, stroke, and other health problems.   HDL cholesterol - Some people call this the \"good\" cholesterol. That's because people with high HDL levels tend to have a lower risk of heart attack, stroke, and other health problems.   Non-HDL cholesterol - Non-HDL cholesterol is your total cholesterol minus your HDL cholesterol.   Triglycerides - Triglycerides are not cholesterol. They are another type of fat. But they often get measured when cholesterol is measured. (Having high triglycerides also seems to increase the risk of heart attack and stroke.)  What should my numbers be? -- Ask your doctor or nurse what your numbers should be. Different people need different goals. If you live outside of the [place], see the table (table 1).  In general, people who do not already have heart disease should aim for:   Total cholesterol below 200   LDL cholesterol below 130, or much lower if they are at risk of heart attack or stroke   HDL cholesterol above 60   Non-HDL cholesterol below 160, or lower if they are at risk of heart attack or stroke   Triglycerides below 150  Remember, though, that many people who cannot meet these goals still have a low " "risk of heart attack and stroke.  What should I do if I have high cholesterol? -- Ask your doctor what your overall risk of heart attack and stroke is. Just having high cholesterol is not always a reason to worry. Having high cholesterol is just one of many things that can increase your risk of heart attack and stroke.  Other things that increase your risk include:   Smoking   High blood pressure   Having a parent or sibling who got heart disease at a young age - Young, in this case, means younger than 55 for males and younger than 65 for females.   A diet that is not heart healthy - A \"heart-healthy\" diet includes lots of fruits and vegetables, fiber, and healthy fats (like those found in fish, nuts, and certain oils). It also means limiting sugar and unhealthy fats.   Older age  If you are at high risk of heart attack and stroke, having high cholesterol is a problem. But if you are at low risk, high cholesterol might not need treatment.  Should I take medicine to lower cholesterol? -- Not everyone who has high cholesterol needs medicines. Your doctor or nurse will decide if you need them based on your age, family history, and other health concerns.  There are many different medicines used to lower cholesterol (table 2). Some help your body make less cholesterol. Some keep your body from absorbing cholesterol from foods. Some help your body get rid of cholesterol faster. The medicines most often used to treat high cholesterol are called \"statins.\"  You should probably take a statin if you:   Already had a heart attack or stroke   Have known heart disease   Have diabetes   Have a condition called \"peripheral artery disease,\" which makes it painful to walk, and happens when the arteries in your legs get clogged with fatty deposits   Have an \"abdominal aortic aneurysm,\" which is a widening of the main artery in the belly  Most people with any of the conditions listed above should take a statin no matter what their " "cholesterol level is. If your doctor or nurse prescribes a statin, it's important to keep taking it. The medicine might not make you feel any different. But it can help prevent heart attack, stroke, and death.  If your doctor or nurse recommends taking medicine to help lower your cholesterol, make sure that you know what it is called. Follow all the instructions for how to take it. For example, some medicines work better when you take them in the evening. Some need to be taken with food.  Tell your doctor or nurse if your medicine causes any side effects that bother you. They might be able to switch you to a different medicine.  Can I lower my cholesterol without medicines? -- Yes. You can help lower your cholesterol by doing these things:   You can lower your LDL, or \"bad,\" cholesterol by avoiding red meat, butter, fried foods, cheese, and other foods that have a lot of saturated fat.   You can lower triglycerides by avoiding sugary foods, fried foods, and excess alcohol.   If you have excess weight, it can help to lose weight. Your doctor or nurse can help you do this in a healthy way.   Try to get regular physical activity. Even gentle forms of exercise, like walking, are good for your health.  Even if these steps don't change your cholesterol very much, they can improve your health in many other ways.  All topics are updated as new evidence becomes available and our peer review process is complete.  This topic retrieved from [employer] on: Mar 01, 2024.  Topic 80027 Version 25.0  Release: 32.2.4 - C32.59  © 2024 UpToDate, Inc. and/or its affiliates. All rights reserved.  table 1: Cholesterol and triglyceride measurements in the [place] and elsewhere     Measurement used within the [place] Milligrams/deciliter (mg/dL)  Measurement used most places outside of the [place] Millimoles/liter (mmol/Liter)     Level to aim for  Level to aim for    Total cholesterol  Below 200 Below 5.17   LDL cholesterol  Below 130, or much lower if at " risk of heart attack and stroke Below 3.36, or much lower if at risk of heart attack and stroke   HDL cholesterol  Above 60 Above 1.55   Triglycerides  Below 150 Below 1.7   Cholesterol is measured differently in the [place] than it is in most other countries. This table shows values used within and outside of the [place]. It includes the cholesterol and triglyceride levels that most people who do not have heart disease should aim for.  Graphic 30117 Version 5.0  table 2: Lipid-lowering medicines  Generic name  Brand name    Statins    Atorvastatin Lipitor   Fluvastatin Lescol, Lescol XL   Lovastatin Mevacor, Altoprev   Pitavastatin Livalo   Pravastatin Pravachol   Rosuvastatin Crestor   Simvastatin Zocor   PCSK9 inhibitors    Alirocumab Praluent   Evolocumab Repatha, Repatha SureClick   Cholesterol absorption inhibitors    Ezetimibe Zetia   Bile acid sequestrants    Cholestyramine Prevalite, Questran, Questran Light   Colesevelam Welchol   Colestipol Colestid   Niacin (nicotinic acid)    Niacin immediate release     Niacin extended release Niaspan   Fibrates    Fenofibrate Fenoglide, Tricor, Triglide, others   Gemfibrozil Lopid   Brand names listed are for medicines available in the [place] and some other countries.  Graphic 67769 Version 7.0  Consumer Information Use and Disclaimer   Disclaimer: This generalized information is a limited summary of diagnosis, treatment, and/or medication information. It is not meant to be comprehensive and should be used as a tool to help the user understand and/or assess potential diagnostic and treatment options. It does NOT include all information about conditions, treatments, medications, side effects, or risks that may apply to a specific patient. It is not intended to be medical advice or a substitute for the medical advice, diagnosis, or treatment of a health care provider based on the health care provider's examination and assessment of a patient's specific and unique circumstances.  Patients must speak with a health care provider for complete information about their health, medical questions, and treatment options, including any risks or benefits regarding use of medications. This information does not endorse any treatments or medications as safe, effective, or approved for treating a specific patient. UpToDate, Inc. and its affiliates disclaim any warranty or liability relating to this information or the use thereof.The use of this information is governed by the Terms of Use, available at https://www.[employer]uwer.com/en/know/clinical-effectiveness-terms. 2024© UpToDate, Inc. and its affiliates and/or licensors. All rights reserved.  Copyright   © 2024 UpToDate, Inc. and/or its affiliates. All rights reserved.

## 2024-09-23 NOTE — ASSESSMENT & PLAN NOTE
With mult CAD RF B>R further NI testing  ISMAEL ordered  If negative, discussed his DENT is then likely due to weight/deconditioning, RTO yearly and simply call if sx worsen    HTN and Dyslipidemia mgmt per PCP

## 2024-09-23 NOTE — PROGRESS NOTES
" Patient ID: Walter Chopra is a 53 y.o. male.        Plan:      DENT (dyspnea on exertion)  With mult CAD RF B>R further NI testing  ISMAEL ordered  If negative, discussed his DENT is then likely due to weight/deconditioning, RTO yearly and simply call if sx worsen    HTN and Dyslipidemia mgmt per PCP       Follow up Plan/Other summary comments:  Return in about 1 year (around 9/23/2025).    HPI: Walter is a 54 yo wm her for DENT evaluation.  He quit smoking 10 years ago, has treated HTN and dyslipidemia, recent glucose normal.  Has put on some weight over the years, but notices a bit more DENT lately.  No chest pains nor orthopnea, no PND<, no (pre)syncope, no TIA or nnea sx, no more than some sock line edema gone by mornings to report.    No heavy ETOH  No illicit drugs  No unusual supplements  No h/o RF or murmur  No known FH of premature CAD nor SCD      Results for orders placed or performed in visit on 09/23/24   POCT ECG    Impression    NSR 61 bpm, WNL         Most recent or relevant cardiac/vascular testing:    NA      Past Surgical History:   Procedure Laterality Date    NO PAST SURGERIES      VASECTOMY         Lipid Profile: Reviewed      Review of Systems   10  point ROS  was otherwise non pertinent or negative except as per HPI or as below.         Objective:     /78 (BP Location: Left arm, Patient Position: Sitting, Cuff Size: Large)   Pulse 66   Resp 16   Ht 5' 11\" (1.803 m)   Wt 116 kg (255 lb)   SpO2 97%   BMI 35.57 kg/m²     PHYSICAL EXAM:    General:  Normal appearance in no distress.  Eyes:  Anicteric.  Oral mucosa:  Moist.  Neck:  No JVD. Carotid upstrokes are brisk without bruits.  No masses.  Chest:  Clear to auscultation.  Cardiac:  No palpable PMI.  Normal S1 and S2.  No murmur gallop or rub.  Abdomen:  Soft and nontender. No palpable organomegaly or aortic enlargement.  Extremities:  No peripheral edema.  Musculoskeletal:  Symmetric.   Vascular:  Femoral pulses are brisk without " bruits.  Popliteal pulses are intact bilaterally.   Pedal pulses are intact.  Neuro:  Grossly symmetric.  Psych:  Alert and oriented x3.      Meds reviewed.    Current Outpatient Medications:     allopurinol (ZYLOPRIM) 100 mg tablet, TAKE 1 TABLET DAILY, Disp: 90 tablet, Rfl: 3    Ascorbic Acid (vitamin C) 100 MG tablet, Take 100 mg by mouth daily, Disp: , Rfl:     b complex vitamins capsule, Take 1 capsule by mouth daily, Disp: , Rfl:     Cholecalciferol (VITAMIN D PO), Take 1 tablet by mouth daily, Disp: , Rfl:     citalopram (CeleXA) 40 mg tablet, TAKE 1 TABLET DAILY, Disp: 90 tablet, Rfl: 3    lisinopril (ZESTRIL) 5 mg tablet, TAKE 1 TABLET DAILY, Disp: 90 tablet, Rfl: 3    omeprazole (PriLOSEC) 20 mg delayed release capsule, TAKE 1 CAPSULE DAILY, Disp: 90 capsule, Rfl: 3    simvastatin (ZOCOR) 20 mg tablet, TAKE 1 TABLET DAILY, Disp: 90 tablet, Rfl: 3    zinc gluconate 50 mg tablet, Take 50 mg by mouth daily, Disp: , Rfl:      Past Medical History:   Diagnosis Date    Annual physical exam 11/16/2020    GERD (gastroesophageal reflux disease)     Hyperlipidemia     Hypertension     Major depressive disorder, single episode, moderate (HCC) 06/16/2021    PER CMS/ICD 10 CODING GUIDELINES - per provider approval           Social History     Tobacco Use   Smoking Status Former    Current packs/day: 0.00    Types: Cigarettes    Quit date: 2014    Years since quitting: 10.7   Smokeless Tobacco Never

## 2024-10-15 ENCOUNTER — HOSPITAL ENCOUNTER (OUTPATIENT)
Dept: NON INVASIVE DIAGNOSTICS | Facility: HOSPITAL | Age: 54
Discharge: HOME | End: 2024-10-15
Attending: INTERNAL MEDICINE
Payer: COMMERCIAL

## 2024-10-15 VITALS
HEIGHT: 71 IN | HEART RATE: 68 BPM | DIASTOLIC BLOOD PRESSURE: 76 MMHG | WEIGHT: 255 LBS | BODY MASS INDEX: 35.7 KG/M2 | SYSTOLIC BLOOD PRESSURE: 124 MMHG

## 2024-10-15 DIAGNOSIS — E78.2 MIXED HYPERLIPIDEMIA: ICD-10-CM

## 2024-10-15 DIAGNOSIS — I10 PRIMARY HYPERTENSION: ICD-10-CM

## 2024-10-15 DIAGNOSIS — R06.09 DOE (DYSPNEA ON EXERTION): ICD-10-CM

## 2024-10-15 LAB
AORTIC ROOT: 4 CM
CHEST PAIN STATEMENT: NORMAL
E WAVE DECELERATION TIME: 219 MS
E/A RATIO: 1.26
FRACTIONAL SHORTENING: 30 (ref 28–44)
INTERVENTRICULAR SEPTUM IN DIASTOLE (PARASTERNAL SHORT AXIS VIEW): 0.8 CM
INTERVENTRICULAR SEPTUM: 0.8 CM (ref 0.6–1.1)
LEFT ATRIUM SIZE: 3.8 CM
LEFT INTERNAL DIMENSION IN SYSTOLE: 3.8 CM (ref 2.1–4)
LEFT VENTRICULAR INTERNAL DIMENSION IN DIASTOLE: 5.4 CM (ref 3.5–6)
LEFT VENTRICULAR POSTERIOR WALL IN END DIASTOLE: 1 CM
LEFT VENTRICULAR STROKE VOLUME: 78 ML
LVSV (TEICH): 78 ML
MAX DIASTOLIC BP: 88 MMHG
MAX HR PERCENT: 88 %
MAX HR: 146 BPM
MAX PREDICTED HEART RATE: 165 BPM
MV E'TISSUE VEL-LAT: 12 CM/S
MV E'TISSUE VEL-SEP: 11 CM/S
MV PEAK A VEL: 0.61 M/S
MV PEAK E VEL: 77 CM/S
MV STENOSIS PRESSURE HALF TIME: 64 MS
MV VALVE AREA P 1/2 METHOD: 3.44
PROTOCOL NAME: NORMAL
RATE PRESSURE PRODUCT: NORMAL
REASON FOR TERMINATION: NORMAL
SL CV LV EF: 55
SL CV PED ECHO LEFT VENTRICLE DIASTOLIC VOLUME (MOD BIPLANE) 2D: 140 ML
SL CV PED ECHO LEFT VENTRICLE SYSTOLIC VOLUME (MOD BIPLANE) 2D: 62 ML
SL CV STRESS RECOVERY BP: NORMAL MMHG
SL CV STRESS RECOVERY HR: 80 BPM
SL CV STRESS RECOVERY O2 SAT: 99 %
SL CV STRESS STAGE REACHED: 2
STRESS ANGINA INDEX: 0
STRESS BASELINE BP: NORMAL MMHG
STRESS BASELINE HR: 68 BPM
STRESS O2 SAT REST: 99 %
STRESS PEAK HR: 146 BPM
STRESS POST ESTIMATED WORKLOAD: 7 METS
STRESS POST EXERCISE DUR MIN: 6 MIN
STRESS POST EXERCISE DUR MIN: 6 MIN
STRESS POST EXERCISE DUR SEC: 1 SEC
STRESS POST EXERCISE DUR SEC: 1 SEC
STRESS POST O2 SAT PEAK: 96 %
STRESS POST PEAK BP: 196 MMHG
STRESS POST PEAK HR: 146 BPM
STRESS POST PEAK SYSTOLIC BP: 196 MMHG
TARGET HR FORMULA: NORMAL
TEST INDICATION: NORMAL
TR MAX PG: 21 MMHG
TR PEAK VELOCITY: 2.3 M/S
TRICUSPID VALVE PEAK REGURGITATION VELOCITY: 2.31 M/S

## 2024-10-15 PROCEDURE — 93350 STRESS TTE ONLY: CPT | Performed by: INTERNAL MEDICINE

## 2024-10-15 PROCEDURE — 93350 STRESS TTE ONLY: CPT

## 2024-10-17 ENCOUNTER — RA CDI HCC (OUTPATIENT)
Dept: OTHER | Facility: HOSPITAL | Age: 54
End: 2024-10-17

## 2024-10-17 PROBLEM — Z00.00 ANNUAL PHYSICAL EXAM: Status: RESOLVED | Noted: 2020-11-16 | Resolved: 2024-10-17

## 2024-10-17 NOTE — PROGRESS NOTES
HCC coding opportunities          Chart Reviewed number of suggestions sent to Provider: 1   refer to medication list (celexa); Can Depression be further classified as per ICD 10 coding guidelines.   Patients Insurance        Commercial Insurance: [employer]

## 2024-10-24 ENCOUNTER — OFFICE VISIT (OUTPATIENT)
Dept: FAMILY MEDICINE CLINIC | Facility: CLINIC | Age: 54
End: 2024-10-24
Payer: COMMERCIAL

## 2024-10-24 VITALS
HEART RATE: 65 BPM | HEIGHT: 71 IN | DIASTOLIC BLOOD PRESSURE: 78 MMHG | WEIGHT: 257.6 LBS | BODY MASS INDEX: 36.06 KG/M2 | OXYGEN SATURATION: 96 % | TEMPERATURE: 97.3 F | SYSTOLIC BLOOD PRESSURE: 128 MMHG | RESPIRATION RATE: 18 BRPM

## 2024-10-24 DIAGNOSIS — F41.9 ANXIETY: Primary | ICD-10-CM

## 2024-10-24 LAB
CHEST PAIN STATEMENT: NORMAL
MAX DIASTOLIC BP: 88 MMHG
MAX PREDICTED HEART RATE: 165 BPM
PROTOCOL NAME: NORMAL
REASON FOR TERMINATION: NORMAL
STRESS POST EXERCISE DUR MIN: 6 MIN
STRESS POST EXERCISE DUR SEC: 1 SEC
STRESS POST PEAK HR: 146 BPM
STRESS POST PEAK SYSTOLIC BP: 196 MMHG
TARGET HR FORMULA: NORMAL
TEST INDICATION: NORMAL

## 2024-10-24 PROCEDURE — 99214 OFFICE O/P EST MOD 30 MIN: CPT | Performed by: INTERNAL MEDICINE

## 2024-10-24 RX ORDER — BUPROPION HYDROCHLORIDE 150 MG/1
150 TABLET ORAL EVERY MORNING
Qty: 30 TABLET | Refills: 1 | Status: SHIPPED | OUTPATIENT
Start: 2024-10-24 | End: 2025-04-22

## 2024-10-24 NOTE — ASSESSMENT & PLAN NOTE
Orders:    buPROPion (WELLBUTRIN XL) 150 mg 24 hr tablet; Take 1 tablet (150 mg total) by mouth every morning  Add wellbutrin daily in AM Continue celexa 40mg daily

## 2024-10-24 NOTE — PROGRESS NOTES
Ambulatory Visit  Name: Walter Chopra      : 1970      MRN: [id number]  Encounter Provider: Nela Reynoso DO  Encounter Date: 10/24/2024   Encounter department: [facility]    Assessment & Plan  Anxiety    Orders:    buPROPion (WELLBUTRIN XL) 150 mg 24 hr tablet; Take 1 tablet (150 mg total) by mouth every morning  Add wellbutrin daily in AM Continue celexa 40mg daily     Depression Screening and Follow-up Plan: Patient was screened for depression during today's encounter. They screened negative with a PHQ-2 score of 2.    Rto 3 months   History of Present Illness     HPI  Pt has been more emotional and anxious especially after he visits with his daughter who lives [facility] Symptoms linger for several days after he sees her He enjoys visiting but dislikes leaving and not seeing her for extended time He will see her at [facility] but the emotions and anxiety have affected him for longer periods and he thinks needs assistance managing Not sleeping well at times Work going well and he feels overall managing day to day and celexa has been helpful /tolerated    Review of Systems   Constitutional:  Negative for chills and fever.   HENT: Negative.     Eyes:  Negative for visual disturbance.   Respiratory:  Negative for cough and shortness of breath.    Cardiovascular:  Negative for chest pain.   Gastrointestinal: Negative.    Genitourinary: Negative.    Musculoskeletal: Negative.    Neurological:  Negative for dizziness, light-headedness and headaches.   Psychiatric/Behavioral:  Negative for sleep disturbance. The patient is not nervous/anxious.      Past Medical History:   Diagnosis Date    Annual physical exam 2020    Annual physical exam 2020    GERD (gastroesophageal reflux disease)     Hyperlipidemia     Hypertension     Major depressive disorder, single episode, moderate (HCC) 2021    PER CMS/ICD 10 CODING GUIDELINES - per provider approval     Past Surgical History:  "  Procedure Laterality Date    NO PAST SURGERIES      VASECTOMY       Social History     Socioeconomic History    Marital status: /Civil Union     Spouse name: Not on file    Number of children: Not on file    Years of education: Not on file    Highest education level: Not on file   Occupational History    Not on file   Tobacco Use    Smoking status: Former     Current packs/day: 0.00     Types: Cigarettes     Quit date: 2014     Years since quitting: 10.8    Smokeless tobacco: Never   Vaping Use    Vaping status: Never Used   Substance and Sexual Activity    Alcohol use: Not Currently     Comment: occassionally    Drug use: Not Currently    Sexual activity: Not on file   Other Topics Concern    Not on file   Social History Narrative    Not on file     Social Determinants of Health     Financial Resource Strain: Not on file   Food Insecurity: Not on file   Transportation Needs: Not on file   Physical Activity: Not on file   Stress: Not on file   Social Connections: Not on file   Intimate Partner Violence: Not on file   Housing Stability: Not on file     No Known Allergies        Objective     /78   Pulse 65   Temp (!) 97.3 °F (36.3 °C) (Temporal)   Resp 18   Ht 5' 11\" (1.803 m)   Wt 117 kg (257 lb 9.6 oz)   SpO2 96%   BMI 35.93 kg/m²     Physical Exam  Vitals and nursing note reviewed.   Constitutional:       General: He is not in acute distress.     Appearance: Normal appearance. He is not ill-appearing, toxic-appearing or diaphoretic.   HENT:      Head: Normocephalic and atraumatic.      Right Ear: External ear normal.      Left Ear: External ear normal.      Nose: Nose normal.      Mouth/Throat:      Mouth: Mucous membranes are moist.   Eyes:      General: No scleral icterus.     Extraocular Movements: Extraocular movements intact.      Conjunctiva/sclera: Conjunctivae normal.      Pupils: Pupils are equal, round, and reactive to light.   Cardiovascular:      Rate and Rhythm: Normal rate and " regular rhythm.      Pulses: Normal pulses.      Heart sounds: Normal heart sounds. No murmur heard.  Pulmonary:      Effort: Pulmonary effort is normal. No respiratory distress.      Breath sounds: Normal breath sounds. No wheezing.   Abdominal:      General: Bowel sounds are normal. There is no distension.      Palpations: Abdomen is soft.      Tenderness: There is no abdominal tenderness.   Musculoskeletal:      Cervical back: Normal range of motion and neck supple.      Right lower leg: No edema.      Left lower leg: No edema.   Lymphadenopathy:      Cervical: No cervical adenopathy.   Skin:     General: Skin is warm and dry.   Neurological:      General: No focal deficit present.      Mental Status: He is alert and oriented to person, place, and time. Mental status is at baseline.      Cranial Nerves: No cranial nerve deficit.   Psychiatric:         Mood and Affect: Mood normal.         Behavior: Behavior normal.         Thought Content: Thought content normal.         Judgment: Judgment normal.

## 2024-10-29 ENCOUNTER — OFFICE VISIT (OUTPATIENT)
Dept: FAMILY MEDICINE CLINIC | Facility: CLINIC | Age: 54
End: 2024-10-29
Payer: COMMERCIAL

## 2024-10-29 VITALS
HEART RATE: 67 BPM | HEIGHT: 71 IN | DIASTOLIC BLOOD PRESSURE: 70 MMHG | BODY MASS INDEX: 35.98 KG/M2 | OXYGEN SATURATION: 98 % | TEMPERATURE: 98.2 F | RESPIRATION RATE: 18 BRPM | WEIGHT: 257 LBS | SYSTOLIC BLOOD PRESSURE: 126 MMHG

## 2024-10-29 DIAGNOSIS — J01.00 ACUTE NON-RECURRENT MAXILLARY SINUSITIS: Primary | ICD-10-CM

## 2024-10-29 DIAGNOSIS — J98.01 BRONCHOSPASM: ICD-10-CM

## 2024-10-29 PROBLEM — R06.09 DOE (DYSPNEA ON EXERTION): Status: RESOLVED | Noted: 2024-09-23 | Resolved: 2024-10-29

## 2024-10-29 PROCEDURE — 99214 OFFICE O/P EST MOD 30 MIN: CPT | Performed by: INTERNAL MEDICINE

## 2024-10-29 RX ORDER — ALBUTEROL SULFATE 90 UG/1
2 INHALANT RESPIRATORY (INHALATION) EVERY 6 HOURS PRN
Qty: 6.7 G | Refills: 5 | Status: SHIPPED | OUTPATIENT
Start: 2024-10-29

## 2024-10-29 RX ORDER — BENZONATATE 100 MG/1
100 CAPSULE ORAL 3 TIMES DAILY PRN
Qty: 20 CAPSULE | Refills: 0 | Status: SHIPPED | OUTPATIENT
Start: 2024-10-29 | End: 2024-11-05

## 2024-10-29 RX ORDER — AZITHROMYCIN 250 MG/1
TABLET, FILM COATED ORAL
Qty: 6 TABLET | Refills: 0 | Status: SHIPPED | OUTPATIENT
Start: 2024-10-29 | End: 2024-11-02

## 2024-10-29 RX ORDER — TRAZODONE HYDROCHLORIDE 50 MG/1
50 TABLET, FILM COATED ORAL
Qty: 30 TABLET | Refills: 5 | Status: SHIPPED | OUTPATIENT
Start: 2024-10-29

## 2024-10-29 NOTE — PROGRESS NOTES
Ambulatory Visit  Name: Walter Chopra      : 1970      MRN: [id number]  Encounter Provider: Nela Reynoso DO  Encounter Date: 10/29/2024   Encounter department: [facility]    Assessment & Plan  Acute non-recurrent maxillary sinusitis    Orders:    azithromycin (ZITHROMAX) 250 mg tablet; Take 2 tablets today then 1 tablet daily x 4 days    benzonatate (TESSALON PERLES) 100 mg capsule; Take 1 capsule (100 mg total) by mouth 3 (three) times a day as needed for cough for up to 7 days    traZODone (DESYREL) 50 mg tablet; Take 1 tablet (50 mg total) by mouth daily at bedtime  Take meds as above  Off from work today - rest encouraged Stay hydrated  He can resume Trazadone for sleep as it had work in past but current sxs seem to be source of imparied sleep  Bronchospasm    Orders:    albuterol (Proventil HFA) 90 mcg/act inhaler; Inhale 2 puffs every 6 (six) hours as needed for wheezing  Use prn May need to take in pm to rest better for next few days and then prn     History of Present Illness     HPI  Cough which interferes with sleep for past few days No fever or chills Ill 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 Negative for chills and fever.   HENT:  Positive for congestion, postnasal drip and sinus pressure.    Respiratory:  Positive for cough and wheezing.    Cardiovascular:  Negative for chest pain, palpitations and leg swelling.   Gastrointestinal: Negative.  Negative for abdominal distention and abdominal pain.   Genitourinary: Negative.    Musculoskeletal: Negative.    Neurological:  Negative for dizziness, light-headedness and headaches.   Psychiatric/Behavioral:  Negative for sleep disturbance. The patient is not nervous/anxious.      Past Medical  "History:   Diagnosis Date    Annual physical exam 11/16/2020    Annual physical exam 11/16/2020    GERD (gastroesophageal reflux disease)     Hyperlipidemia     Hypertension     Major depressive disorder, single episode, moderate (HCC) 06/16/2021    PER CMS/ICD 10 CODING GUIDELINES - per provider approval     Past Surgical History:   Procedure Laterality Date    NO PAST SURGERIES      VASECTOMY         Social History     Socioeconomic History    Marital status: /Civil Union     Spouse name: Not on file    Number of children: Not on file    Years of education: Not on file    Highest education level: Not on file   Occupational History    Not on file   Tobacco Use    Smoking status: Former     Current packs/day: 0.00     Types: Cigarettes     Quit date: 2014     Years since quitting: 10.8    Smokeless tobacco: Never   Vaping Use    Vaping status: Never Used   Substance and Sexual Activity    Alcohol use: Not Currently     Comment: occassionally    Drug use: Not Currently    Sexual activity: Not on file   Other Topics Concern    Not on file   Social History Narrative    Not on file     Social Determinants of Health     Financial Resource Strain: Not on file   Food Insecurity: Not on file   Transportation Needs: Not on file   Physical Activity: Not on file   Stress: Not on file   Social Connections: Not on file   Intimate Partner Violence: Not on file   Housing Stability: Not on file     No Known Allergies        Objective     /70   Pulse 67   Temp 98.2 °F (36.8 °C) (Temporal)   Resp 18   Ht 5' 11\" (1.803 m)   Wt 117 kg (257 lb)   SpO2 98%   BMI 35.84 kg/m²     Physical Exam  Vitals and nursing note reviewed.   Constitutional:       General: He is not in acute distress.     Appearance: Normal appearance. He is not ill-appearing, toxic-appearing or diaphoretic.   HENT:      Head: Normocephalic and atraumatic.      Right Ear: External ear normal.      Left Ear: External ear normal.      Nose: Congestion " present.      Mouth/Throat:      Mouth: Mucous membranes are moist.   Eyes:      General: No scleral icterus.     Extraocular Movements: Extraocular movements intact.      Conjunctiva/sclera: Conjunctivae normal.      Pupils: Pupils are equal, round, and reactive to light.   Cardiovascular:      Rate and Rhythm: Normal rate and regular rhythm.      Pulses: Normal pulses.      Heart sounds: Normal heart sounds.   Pulmonary:      Effort: Pulmonary effort is normal. No respiratory distress.      Breath sounds: Normal breath sounds. No wheezing.   Abdominal:      General: Bowel sounds are normal. There is no distension.      Palpations: Abdomen is soft.      Tenderness: There is no abdominal tenderness.   Musculoskeletal:      Cervical back: Normal range of motion and neck supple.   Lymphadenopathy:      Cervical: No cervical adenopathy.   Skin:     General: Skin is warm and dry.      Coloration: Skin is not jaundiced or pale.   Neurological:      General: No focal deficit present.      Mental Status: He is alert and oriented to person, place, and time. Mental status is at baseline.   Psychiatric:         Mood and Affect: Mood normal.         Behavior: Behavior normal.         Thought Content: Thought content normal.         Judgment: Judgment normal.

## 2024-11-01 ENCOUNTER — TELEPHONE (OUTPATIENT)
Age: 54
End: 2024-11-01

## 2024-11-01 ENCOUNTER — APPOINTMENT (OUTPATIENT)
Dept: RADIOLOGY | Facility: MEDICAL CENTER | Age: 54
End: 2024-11-01
Payer: COMMERCIAL

## 2024-11-01 ENCOUNTER — OFFICE VISIT (OUTPATIENT)
Dept: FAMILY MEDICINE CLINIC | Facility: CLINIC | Age: 54
End: 2024-11-01
Payer: COMMERCIAL

## 2024-11-01 VITALS
HEIGHT: 71 IN | RESPIRATION RATE: 16 BRPM | WEIGHT: 256 LBS | OXYGEN SATURATION: 95 % | DIASTOLIC BLOOD PRESSURE: 78 MMHG | SYSTOLIC BLOOD PRESSURE: 110 MMHG | HEART RATE: 85 BPM | TEMPERATURE: 97.9 F | BODY MASS INDEX: 35.84 KG/M2

## 2024-11-01 DIAGNOSIS — R05.9 COUGH, UNSPECIFIED TYPE: Primary | ICD-10-CM

## 2024-11-01 DIAGNOSIS — J30.9 ALLERGIC RHINITIS, UNSPECIFIED SEASONALITY, UNSPECIFIED TRIGGER: ICD-10-CM

## 2024-11-01 DIAGNOSIS — R05.4 COUGH SYNCOPE: ICD-10-CM

## 2024-11-01 DIAGNOSIS — R05.2 SUBACUTE COUGH: Primary | ICD-10-CM

## 2024-11-01 DIAGNOSIS — R05.9 COUGH, UNSPECIFIED TYPE: ICD-10-CM

## 2024-11-01 DIAGNOSIS — R55 COUGH SYNCOPE: ICD-10-CM

## 2024-11-01 PROCEDURE — 99213 OFFICE O/P EST LOW 20 MIN: CPT | Performed by: PHYSICIAN ASSISTANT

## 2024-11-01 PROCEDURE — 71046 X-RAY EXAM CHEST 2 VIEWS: CPT

## 2024-11-01 RX ORDER — DEXTROMETHORPHAN HYDROBROMIDE AND PROMETHAZINE HYDROCHLORIDE 15; 6.25 MG/5ML; MG/5ML
5 SYRUP ORAL 4 TIMES DAILY PRN
Qty: 240 ML | Refills: 1 | Status: SHIPPED | OUTPATIENT
Start: 2024-11-01

## 2024-11-01 NOTE — TELEPHONE ENCOUNTER
Please check if anyone has availability today to see pt - I donot since I am leaving early today  I did order CXR and he should have that at least done

## 2024-11-01 NOTE — PROGRESS NOTES
"Ambulatory Visit  Name: Walter Chopra      : 1970      MRN: [id number]  Encounter Provider: Belinda Lopez PA-C  Encounter Date: 2024   Encounter department: [facility]    Assessment & Plan  Subacute cough  Likely allergic in nature. Recommend starting daily anti-histamine and/or daily Flonase NS. Explained that these can be used year round.        Allergic rhinitis, unspecified seasonality, unspecified trigger  Likely allergic in nature. Recommend starting daily anti-histamine and/or daily Flonase NS. Explained that these can be used year round.        Cough syncope  Pt to complete CXR after today's OV.         Depression Screening and Follow-up Plan: Patient was screened for depression during today's encounter. They screened negative with a PHQ-2 score of 0.      History of Present Illness     Walter is here today for follow up. Was recently seen by Dr. Reynoso for cough, started on Zithromax, tessalon perles, and albuterol inhaler. He will finish Zithromax tomorrow. States last evening, was laying in bed and started to cough. He went to stand up and passed out. He states came to instantly. Wife wanted him to go to the ER but he declined. States always has a cough, sometimes will take anti-histamine pills which do help \"dry up\" sinuses, but then he stops taking them. CXR was ordered earlier today by Dr. Reynoso.          Review of Systems   Constitutional:  Negative for chills and fever.   HENT:  Negative for ear pain and sore throat.    Eyes:  Negative for pain and visual disturbance.   Respiratory:  Positive for cough. Negative for shortness of breath.    Cardiovascular:  Negative for chest pain and palpitations.   Gastrointestinal:  Negative for abdominal pain and vomiting.   Genitourinary:  Negative for dysuria and hematuria.   Musculoskeletal:  Negative for arthralgias and back pain.   Skin:  Negative for color change and rash.   Neurological:  Positive for syncope. Negative for " "seizures.   All other systems reviewed and are negative.          Objective     /78 (BP Location: Left arm, Patient Position: Sitting, Cuff Size: Large)   Pulse 85   Temp 97.9 °F (36.6 °C) (Temporal)   Resp 16   Ht 5' 11\" (1.803 m)   Wt 116 kg (256 lb)   SpO2 95%   BMI 35.70 kg/m²     Physical Exam  Vitals reviewed.   Constitutional:       General: He is not in acute distress.     Appearance: He is well-developed. He is not diaphoretic.   HENT:      Head: Normocephalic and atraumatic.      Right Ear: Hearing, tympanic membrane, ear canal and external ear normal.      Left Ear: Hearing, tympanic membrane, ear canal and external ear normal.      Nose: Congestion and rhinorrhea present.      Mouth/Throat:      Mouth: Mucous membranes are moist.      Pharynx: Oropharynx is clear. Uvula midline. No oropharyngeal exudate.   Eyes:      General: No scleral icterus.        Right eye: No discharge.         Left eye: No discharge.      Conjunctiva/sclera: Conjunctivae normal.   Neck:      Thyroid: No thyromegaly.      Vascular: No carotid bruit.   Cardiovascular:      Rate and Rhythm: Normal rate and regular rhythm.      Heart sounds: Normal heart sounds. No murmur heard.  Pulmonary:      Effort: Pulmonary effort is normal. No respiratory distress.      Breath sounds: Normal breath sounds. No wheezing.   Abdominal:      General: Bowel sounds are normal. There is no distension.      Palpations: Abdomen is soft. There is no mass.      Tenderness: There is no abdominal tenderness. There is no guarding or rebound.   Musculoskeletal:         General: No tenderness. Normal range of motion.      Cervical back: Neck supple.   Lymphadenopathy:      Cervical: No cervical adenopathy.   Skin:     General: Skin is warm and dry.      Findings: No erythema or rash.   Neurological:      Mental Status: He is alert and oriented to person, place, and time.   Psychiatric:         Behavior: Behavior normal.         Thought Content: " Thought content normal.         Judgment: Judgment normal.

## 2024-11-01 NOTE — LETTER
November 1, 2024     Patient: Walter Chopra  YOB: 1970  Date of Visit: 11/1/2024      To Whom it May Concern:    Walter Chopra is under my professional care. Walter was seen in my office on 11/1/2024. Please excuse 10/30/24-11/1/24. Will return to work 11/4/2024.    If you have any questions or concerns, please don't hesitate to call.         Sincerely,          Belinda Lopez PA-C        CC: No Recipients

## 2024-11-01 NOTE — TELEPHONE ENCOUNTER
Patient called in he was seen several days ago for cough.  Last night he woke up coughing.  He said he was coughing so hard that he passed out.     Pep asked if  he hit his head.  He said he did not to best of knowledge.  He did see that night stand was moved.    He still has cough and would like advise of what to do.  Co-worker advised that pcp or clinical will follow up with him shortly.  Perp advised patient.  Patient understood and confirmed he is ok at this time.  He will await call.

## 2024-11-20 DIAGNOSIS — K21.9 GASTROESOPHAGEAL REFLUX DISEASE: ICD-10-CM

## 2024-11-20 DIAGNOSIS — F41.9 ANXIETY: ICD-10-CM

## 2024-11-21 RX ORDER — BUPROPION HYDROCHLORIDE 150 MG/1
150 TABLET ORAL EVERY MORNING
Qty: 90 TABLET | Refills: 0 | Status: SHIPPED | OUTPATIENT
Start: 2024-11-21 | End: 2025-05-20

## 2024-12-17 ENCOUNTER — HOSPITAL ENCOUNTER (EMERGENCY)
Facility: HOSPITAL | Age: 54
Discharge: HOME | End: 2024-12-17
Attending: EMERGENCY MEDICINE
Payer: OTHER MISCELLANEOUS

## 2024-12-17 ENCOUNTER — APPOINTMENT (EMERGENCY)
Dept: RADIOLOGY | Facility: HOSPITAL | Age: 54
End: 2024-12-17
Payer: OTHER MISCELLANEOUS

## 2024-12-17 VITALS
SYSTOLIC BLOOD PRESSURE: 111 MMHG | OXYGEN SATURATION: 95 % | DIASTOLIC BLOOD PRESSURE: 77 MMHG | HEART RATE: 73 BPM | RESPIRATION RATE: 20 BRPM | TEMPERATURE: 94 F

## 2024-12-17 DIAGNOSIS — S39.012A STRAIN OF LUMBAR REGION, INITIAL ENCOUNTER: Primary | ICD-10-CM

## 2024-12-17 LAB
BACTERIA UR QL AUTO: NORMAL /HPF
BILIRUB UR QL STRIP: NEGATIVE
CLARITY UR: CLEAR
COLOR UR: YELLOW
GLUCOSE UR STRIP-MCNC: NEGATIVE MG/DL
HGB UR QL STRIP.AUTO: NEGATIVE
KETONES UR STRIP-MCNC: NEGATIVE MG/DL
LEUKOCYTE ESTERASE UR QL STRIP: ABNORMAL
NITRITE UR QL STRIP: NEGATIVE
NON-SQ EPI CELLS URNS QL MICRO: NORMAL /HPF
PH UR STRIP.AUTO: 5.5 [PH]
PROT UR STRIP-MCNC: NEGATIVE MG/DL
RBC #/AREA URNS AUTO: NORMAL /HPF
SP GR UR STRIP.AUTO: 1.02 (ref 1–1.03)
UROBILINOGEN UR STRIP-ACNC: <2 MG/DL
WBC #/AREA URNS AUTO: NORMAL /HPF

## 2024-12-17 PROCEDURE — 81001 URINALYSIS AUTO W/SCOPE: CPT | Performed by: EMERGENCY MEDICINE

## 2024-12-17 PROCEDURE — 96374 THER/PROPH/DIAG INJ IV PUSH: CPT

## 2024-12-17 PROCEDURE — 96375 TX/PRO/DX INJ NEW DRUG ADDON: CPT

## 2024-12-17 PROCEDURE — 99283 EMERGENCY DEPT VISIT LOW MDM: CPT

## 2024-12-17 PROCEDURE — 72100 X-RAY EXAM L-S SPINE 2/3 VWS: CPT

## 2024-12-17 PROCEDURE — 99285 EMERGENCY DEPT VISIT HI MDM: CPT | Performed by: EMERGENCY MEDICINE

## 2024-12-17 RX ORDER — ACETAMINOPHEN 325 MG/1
975 TABLET ORAL ONCE
Status: COMPLETED | OUTPATIENT
Start: 2024-12-17 | End: 2024-12-17

## 2024-12-17 RX ORDER — METHOCARBAMOL 500 MG/1
500 TABLET, FILM COATED ORAL 2 TIMES DAILY
Qty: 20 TABLET | Refills: 0 | Status: SHIPPED | OUTPATIENT
Start: 2024-12-17

## 2024-12-17 RX ORDER — IBUPROFEN 600 MG/1
600 TABLET, FILM COATED ORAL EVERY 6 HOURS PRN
Qty: 30 TABLET | Refills: 0 | Status: SHIPPED | OUTPATIENT
Start: 2024-12-17

## 2024-12-17 RX ORDER — OXYCODONE HYDROCHLORIDE 5 MG/1
5 TABLET ORAL EVERY 6 HOURS PRN
Qty: 6 TABLET | Refills: 0 | Status: SHIPPED | OUTPATIENT
Start: 2024-12-17

## 2024-12-17 RX ORDER — HYDROMORPHONE HCL/PF 1 MG/ML
1 SYRINGE (ML) INJECTION ONCE
Refills: 0 | Status: COMPLETED | OUTPATIENT
Start: 2024-12-17 | End: 2024-12-17

## 2024-12-17 RX ORDER — KETOROLAC TROMETHAMINE 30 MG/ML
15 INJECTION, SOLUTION INTRAMUSCULAR; INTRAVENOUS ONCE
Status: COMPLETED | OUTPATIENT
Start: 2024-12-17 | End: 2024-12-17

## 2024-12-17 RX ADMIN — KETOROLAC TROMETHAMINE 15 MG: 30 INJECTION, SOLUTION INTRAMUSCULAR at 10:21

## 2024-12-17 RX ADMIN — HYDROMORPHONE HYDROCHLORIDE 1 MG: 1 INJECTION, SOLUTION INTRAMUSCULAR; INTRAVENOUS; SUBCUTANEOUS at 10:18

## 2024-12-17 RX ADMIN — ACETAMINOPHEN 975 MG: 325 TABLET, FILM COATED ORAL at 10:22

## 2024-12-17 NOTE — DISCHARGE INSTRUCTIONS
Thank you for visiting the Emergency Department today.    Suspect lumbar sprain/strain:  -Tylenol 500 to 1000 mg every 6 hours  -Ibuprofen 400 to 600 mg every 6 hours  -Try ice 20 minutes at a time on and off as needed  -May alternate with heat  -Robaxin muscle relaxer  -Oxycodone for severe/breakthrough pain (do not drive or operate machinery while taking this medication may cause drowsiness, sedation, addiction/dependence)  -Contact physical therapy as needed for follow-up  -Return for severe symptoms numbness tingling weakness bowel or bladder dysfunction new falls or injuries or other concerns

## 2024-12-17 NOTE — Clinical Note
Walter Chopra was seen and treated in our emergency department on 12/17/2024.                Diagnosis:     Walter  .    He may return on this date:     Patient seen and examined emergency department on 12/17/2024.  Please excuse for absence from work 12/17/2024.    Additionally, if needed, patient may be excused 12/18/2024, 12/19/2024, 12/20/2024 if symptoms persist  Patient may return to work sooner if symptoms have resolved     If you have any questions or concerns, please don't hesitate to call.      Damian Goldman, DO    ______________________________           _______________          _______________  Hospital Representative                              Date                                Time

## 2024-12-17 NOTE — ED PROVIDER NOTES
Time reflects when diagnosis was documented in both MDM as applicable and the Disposition within this note       Time User Action Codes Description Comment    12/17/2024 12:27 PM Damian Goldman Add [M54.50] Acute bilateral low back pain without sciatica     12/17/2024 12:27 PM Damian Goldman Remove [M54.50] Acute bilateral low back pain without sciatica     12/17/2024 12:27 PM Damian Goldman Add [S39.012A] Strain of lumbar region, initial encounter           ED Disposition       ED Disposition   Discharge    Condition   Stable    Date/Time   Tue Dec 17, 2024 12:27 PM    Comment   Walter Chopra discharge to home/self care.                   Assessment & Plan       Medical Decision Making  This a 54-year-old male who bent forward and strained his lumbar back.  He did not fall directly on the ground.  He has no history of back issues.  There are no red flag symptoms associate with this.  Differential diagnosis includes lumbar sprain, strain, muscle spasm.  Doubt fracture.  Doubt spinal cord compression syndrome.  Will check a screening lumbar sacral x-ray to help evaluate for osseous abnormality, traumatic listhesis, degenerative changes, pathologic fractures.  Will treat with multimodal approach and outpatient follow-up with consequential spine program work note muscle relaxers NSAIDs.    Problems Addressed:  Strain of lumbar region, initial encounter: acute illness or injury    Amount and/or Complexity of Data Reviewed  Radiology: ordered.    Risk  OTC drugs.  Prescription drug management.        ED Course as of 12/17/24 1644   Tue Dec 17, 2024   1223 Patient reassessed resting comfortably feels markedly improved after analgesia.  Reviewed urine and x-ray results.  X-rays not formally read but I do not see any acute osseous abnormalities based on my interpretation.  Patient is comfortable with plan for discharge will provide work note outpatient pain management and muscle relaxants as well as  referral to physical therapy.       Medications   HYDROmorphone (DILAUDID) injection 1 mg (1 mg Intravenous Given 12/17/24 1018)   ketorolac (TORADOL) injection 15 mg (15 mg Intravenous Given 12/17/24 1021)   acetaminophen (TYLENOL) tablet 975 mg (975 mg Oral Given 12/17/24 1022)       ED Risk Strat Scores                          SBIRT 22yo+      Flowsheet Row Most Recent Value   Initial Alcohol Screen: US AUDIT-C     1. How often do you have a drink containing alcohol? 0 Filed at: 12/17/2024 0851   2. How many drinks containing alcohol do you have on a typical day you are drinking?  0 Filed at: 12/17/2024 0851   3a. Male UNDER 65: How often do you have five or more drinks on one occasion? 0 Filed at: 12/17/2024 0851   Audit-C Score 0 Filed at: 12/17/2024 0851   VERO: How many times in the past year have you...    Used an illegal drug or used a prescription medication for non-medical reasons? Never Filed at: 12/17/2024 0851                            History of Present Illness       Chief Complaint   Patient presents with    Back Pain     Lifting a water jug this morning at work, and felt a pull in his lower back. Complains of lower back pain        Past Medical History:   Diagnosis Date    Annual physical exam 11/16/2020    Annual physical exam 11/16/2020    GERD (gastroesophageal reflux disease)     Hyperlipidemia     Hypertension     Major depressive disorder, single episode, moderate (HCC) 06/16/2021    PER CMS/ICD 10 CODING GUIDELINES - per provider approval      Past Surgical History:   Procedure Laterality Date    NO PAST SURGERIES      VASECTOMY        Family History   Problem Relation Age of Onset    Breast cancer Mother     Diabetes Father     Lymphoma Father         Non-Hodgkin's lymphoma       Social History     Tobacco Use    Smoking status: Former     Current packs/day: 0.00     Types: Cigarettes     Quit date: 2014     Years since quitting: 10.9    Smokeless tobacco: Never   Vaping Use    Vaping  status: Never Used   Substance Use Topics    Alcohol use: Not Currently     Comment: occassionally    Drug use: Not Currently      E-Cigarette/Vaping    E-Cigarette Use Never User       E-Cigarette/Vaping Substances    Nicotine No     THC No     CBD No     Flavoring No     Other No     Unknown No       I have reviewed and agree with the history as documented.     This is a 54-year-old male who presents to the emergency department for evaluation of acute low back pain.  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  Pain is in the generalized lumbar region it is not radiating to hips or legs or thoracic back.  Pain is across the general low back.  No associated numbness tingling weakness bowel or bladder dysfunction saddle anesthesia.  No direct trauma no fall.  No history of similar.  No prior back pain issues or prior back surgery.  Ambulatory with decreased range of motion in the lumbar spine affecting his gait.  Took nothing for symptoms.      Back Pain  Associated symptoms: no abdominal pain, no chest pain, no dysuria and no fever        Review of Systems   Constitutional:  Negative for chills and fever.   HENT:  Negative for ear pain and sore throat.    Eyes:  Negative for pain and visual disturbance.   Respiratory:  Negative for cough and shortness of breath.    Cardiovascular:  Negative for chest pain and palpitations.   Gastrointestinal:  Negative for abdominal pain and vomiting.   Genitourinary:  Negative for dysuria and hematuria.   Musculoskeletal:  Positive for back pain. Negative for arthralgias.   Skin:  Negative for color change and rash.   Neurological:  Negative for seizures and syncope.   All other systems reviewed and are negative.          Objective       ED  Triage Vitals [12/17/24 0850]   Temperature Pulse Blood Pressure Respirations SpO2 Patient Position - Orthostatic VS   (!) 94 °F (34.4 °C) 66 136/79 18 96 % Sitting      Temp Source Heart Rate Source BP Location FiO2 (%) Pain Score    Temporal Monitor Right arm -- 8      Vitals      Date and Time Temp Pulse SpO2 Resp BP Pain Score FACES Pain Rating User   12/17/24 1033 -- -- -- -- -- 10 - Worst Possible Pain -- KTR   12/17/24 1021 -- -- -- -- -- 10 - Worst Possible Pain -- KTR   12/17/24 1018 -- -- -- -- -- 10 - Worst Possible Pain -- KTR   12/17/24 0930 -- 73 95 % 20 111/77 10 - Worst Possible Pain -- KTR   12/17/24 0850 94 °F (34.4 °C) 66 96 % 18 136/79 8 -- KS            Physical Exam  Vitals and nursing note reviewed.   Constitutional:       General: He is not in acute distress.     Appearance: He is well-developed.   HENT:      Head: Normocephalic and atraumatic.   Eyes:      Conjunctiva/sclera: Conjunctivae normal.   Cardiovascular:      Rate and Rhythm: Normal rate and regular rhythm.      Heart sounds: No murmur heard.  Pulmonary:      Effort: Pulmonary effort is normal. No respiratory distress.      Breath sounds: Normal breath sounds.   Abdominal:      Palpations: Abdomen is soft.      Tenderness: There is no abdominal tenderness.   Musculoskeletal:         General: No swelling.      Cervical back: Normal and neck supple.      Thoracic back: Normal.      Lumbar back: Spasms and tenderness present. No bony tenderness. Decreased range of motion.      Comments: There is no midline spinal tenderness in the thoracic or lumbar or sacral region.  There is generalized lumbar paraspinal hypertonicity and spasm.  There is decreased lumbar range of motion with all planes.   Skin:     General: Skin is warm and dry.      Capillary Refill: Capillary refill takes less than 2 seconds.   Neurological:      General: No focal deficit present.      Mental Status: He is alert.      Sensory: No sensory deficit.      Motor: No  weakness.      Deep Tendon Reflexes: Reflexes normal.   Psychiatric:         Mood and Affect: Mood normal.         Results Reviewed       Procedure Component Value Units Date/Time    Urine Microscopic [[id number]]  (Normal) Collected: 12/17/24 1024    Lab Status: Final result Specimen: Urine, Clean Catch Updated: 12/17/24 1110     RBC, UA 0-1 /hpf      WBC, UA 0-1 /hpf      Epithelial Cells None Seen /hpf      Bacteria, UA None Seen /hpf     UA w Reflex to Microscopic w Reflex to Culture [[id number]]  (Abnormal) Collected: 12/17/24 1024    Lab Status: Final result Specimen: Urine, Clean Catch Updated: 12/17/24 1058     Color, UA Yellow     Clarity, UA Clear     Specific Gravity, UA 1.020     pH, UA 5.5     Leukocytes, UA Trace     Nitrite, UA Negative     Protein, UA Negative mg/dl      Glucose, UA Negative mg/dl      Ketones, UA Negative mg/dl      Urobilinogen, UA <2.0 mg/dl      Bilirubin, UA Negative     Occult Blood, UA Negative            XR spine lumbar 2 or 3 views injury   Final Interpretation by Miles Arrieta MD (12/17 1444)      No acute osseous abnormality.      Degenerative changes as described.         Computerized Assisted Algorithm (CAA) may have been used to analyze all applicable images.         Resident: Nadeem Cotton I, the attending radiologist, have reviewed the images and agree with the final report above.      Workstation performed: [id number]             Procedures    ED Medication and Procedure Management   Prior to Admission Medications   Prescriptions Last Dose Informant Patient Reported? Taking?   Ascorbic Acid (vitamin C) 100 MG tablet   Yes No   Sig: Take 100 mg by mouth daily   Cholecalciferol (VITAMIN D PO)  Self Yes No   Sig: Take 1 tablet by mouth daily   albuterol (Proventil HFA) 90 mcg/act inhaler   No No   Sig: Inhale 2 puffs every 6 (six) hours as needed for wheezing   allopurinol (ZYLOPRIM) 100 mg tablet   No No   Sig: TAKE 1 TABLET DAILY   b complex vitamins capsule   Self Yes No   Sig: Take 1 capsule by mouth daily   buPROPion (WELLBUTRIN XL) 150 mg 24 hr tablet   No No   Sig: Take 1 tablet (150 mg total) by mouth every morning   citalopram (CeleXA) 40 mg tablet   No No   Sig: TAKE 1 TABLET DAILY   lisinopril (ZESTRIL) 5 mg tablet   No No   Sig: TAKE 1 TABLET DAILY   omeprazole (PriLOSEC) 20 mg delayed release capsule   No No   Sig: TAKE 1 CAPSULE DAILY   promethazine-dextromethorphan (PHENERGAN-DM) 6.25-15 mg/5 mL oral syrup   No No   Sig: Take 5 mL by mouth 4 (four) times a day as needed for cough   simvastatin (ZOCOR) 20 mg tablet   No No   Sig: TAKE 1 TABLET DAILY   traZODone (DESYREL) 50 mg tablet   No No   Sig: Take 1 tablet (50 mg total) by mouth daily at bedtime   zinc gluconate 50 mg tablet  Self Yes No   Sig: Take 50 mg by mouth daily      Facility-Administered Medications: None     Discharge Medication List as of 12/17/2024 12:30 PM        START taking these medications    Details   ibuprofen (MOTRIN) 600 mg tablet Take 1 tablet (600 mg total) by mouth every 6 (six) hours as needed for moderate pain, Starting Tue 12/17/2024, Normal      methocarbamol (ROBAXIN) 500 mg tablet Take 1 tablet (500 mg total) by mouth 2 (two) times a day, Starting Tue 12/17/2024, Normal      oxyCODONE (Roxicodone) 5 immediate release tablet Take 1 tablet (5 mg total) by mouth every 6 (six) hours as needed for moderate pain for up to 6 doses Max Daily Amount: 20 mg, Starting Tue 12/17/2024, Normal           CONTINUE these medications which have NOT CHANGED    Details   albuterol (Proventil HFA) 90 mcg/act inhaler Inhale 2 puffs every 6 (six) hours as needed for wheezing, Starting Tue 10/29/2024, Normal      allopurinol (ZYLOPRIM) 100 mg tablet TAKE 1 TABLET DAILY, Normal      Ascorbic Acid (vitamin C) 100 MG tablet Take 100 mg by mouth daily, Historical Med      b complex vitamins capsule Take 1 capsule by mouth daily, Historical Med      buPROPion (WELLBUTRIN XL) 150 mg 24 hr tablet Take 1  tablet (150 mg total) by mouth every morning, Starting Thu 11/21/2024, Until Tue 5/20/2025, Normal      Cholecalciferol (VITAMIN D PO) Take 1 tablet by mouth daily, Historical Med      citalopram (CeleXA) 40 mg tablet TAKE 1 TABLET DAILY, Normal      lisinopril (ZESTRIL) 5 mg tablet TAKE 1 TABLET DAILY, Normal      omeprazole (PriLOSEC) 20 mg delayed release capsule TAKE 1 CAPSULE DAILY, Starting Wed 11/20/2024, Normal      promethazine-dextromethorphan (PHENERGAN-DM) 6.25-15 mg/5 mL oral syrup Take 5 mL by mouth 4 (four) times a day as needed for cough, Starting Fri 11/1/2024, Normal      simvastatin (ZOCOR) 20 mg tablet TAKE 1 TABLET DAILY, Normal      traZODone (DESYREL) 50 mg tablet Take 1 tablet (50 mg total) by mouth daily at bedtime, Starting Tue 10/29/2024, Normal      zinc gluconate 50 mg tablet Take 50 mg by mouth daily, Historical Med             ED SEPSIS DOCUMENTATION   Time reflects when diagnosis was documented in both MDM as applicable and the Disposition within this note       Time User Action Codes Description Comment    12/17/2024 12:27 PM Damian Goldman Add [M54.50] Acute bilateral low back pain without sciatica     12/17/2024 12:27 PM Damian Goldman Remove [M54.50] Acute bilateral low back pain without sciatica     12/17/2024 12:27 PM Damian Goldman Add [S39.012A] Strain of lumbar region, initial encounter                  Damian Goldman DO  12/17/24 7189

## 2024-12-30 DIAGNOSIS — F32.A DEPRESSION, UNSPECIFIED DEPRESSION TYPE: ICD-10-CM

## 2024-12-30 DIAGNOSIS — I10 HYPERTENSION, UNSPECIFIED TYPE: ICD-10-CM

## 2024-12-30 DIAGNOSIS — M10.9 GOUT, UNSPECIFIED CAUSE, UNSPECIFIED CHRONICITY, UNSPECIFIED SITE: ICD-10-CM

## 2024-12-30 DIAGNOSIS — E78.2 MIXED HYPERLIPIDEMIA: ICD-10-CM

## 2024-12-30 RX ORDER — LISINOPRIL 5 MG/1
5 TABLET ORAL DAILY
Qty: 90 TABLET | Refills: 1 | Status: SHIPPED | OUTPATIENT
Start: 2024-12-30

## 2024-12-30 RX ORDER — ALLOPURINOL 100 MG/1
100 TABLET ORAL DAILY
Qty: 90 TABLET | Refills: 1 | Status: SHIPPED | OUTPATIENT
Start: 2024-12-30

## 2024-12-30 RX ORDER — SIMVASTATIN 20 MG
20 TABLET ORAL DAILY
Qty: 90 TABLET | Refills: 1 | Status: SHIPPED | OUTPATIENT
Start: 2024-12-30

## 2024-12-30 RX ORDER — CITALOPRAM HYDROBROMIDE 40 MG/1
40 TABLET ORAL DAILY
Qty: 90 TABLET | Refills: 1 | Status: SHIPPED | OUTPATIENT
Start: 2024-12-30

## 2025-01-27 ENCOUNTER — OFFICE VISIT (OUTPATIENT)
Dept: FAMILY MEDICINE CLINIC | Facility: CLINIC | Age: 55
End: 2025-01-27
Payer: COMMERCIAL

## 2025-01-27 VITALS
BODY MASS INDEX: 35.64 KG/M2 | TEMPERATURE: 97.5 F | DIASTOLIC BLOOD PRESSURE: 78 MMHG | HEART RATE: 62 BPM | SYSTOLIC BLOOD PRESSURE: 126 MMHG | OXYGEN SATURATION: 97 % | HEIGHT: 71 IN | RESPIRATION RATE: 18 BRPM | WEIGHT: 254.6 LBS

## 2025-01-27 DIAGNOSIS — E78.5 HYPERLIPIDEMIA, UNSPECIFIED HYPERLIPIDEMIA TYPE: ICD-10-CM

## 2025-01-27 DIAGNOSIS — F41.9 ANXIETY: ICD-10-CM

## 2025-01-27 DIAGNOSIS — K21.9 GASTROESOPHAGEAL REFLUX DISEASE, UNSPECIFIED WHETHER ESOPHAGITIS PRESENT: ICD-10-CM

## 2025-01-27 DIAGNOSIS — I10 HYPERTENSION, UNSPECIFIED TYPE: Primary | ICD-10-CM

## 2025-01-27 PROCEDURE — 99214 OFFICE O/P EST MOD 30 MIN: CPT | Performed by: INTERNAL MEDICINE

## 2025-01-27 NOTE — PROGRESS NOTES
Name: Walter Chopra      : 1970      MRN: [id number]  Encounter Provider: Nela Reynoso DO  Encounter Date: 2025   Encounter department: [facility]  :  Assessment & Plan  Hypertension, unspecified type  Bp stable Lo sodium diet and encouraged regular exercise        Hyperlipidemia, unspecified hyperlipidemia type    Orders:    Lipid panel; Future    Comprehensive metabolic panel; Future  Lo fat diet and increase activity/exercise   Gastroesophageal reflux disease, unspecified whether esophagitis present  Stable Gerd diet        Anxiety  Sxs managed on current regimen Continue same          Rto 6months     Depression Screening and Follow-up Plan: Patient was screened for depression during today's encounter. They screened negative with a PHQ-2 score of 0.      History of Present Illness   HPI  Pt generally well No recent illness No cough No chest pain or sob He has been dealing with some low back issues but seeing chiropractor and sxs improving - he was lifting water jugs at work and pulled his back several weeks ago   Review of Systems   Constitutional:  Negative for chills and fever.   HENT: Negative.     Eyes:  Negative for visual disturbance.   Respiratory:  Negative for cough and shortness of breath.    Cardiovascular: Negative.    Gastrointestinal: Negative.    Genitourinary: Negative.    Musculoskeletal:  Positive for back pain.   Neurological:  Negative for dizziness, light-headedness and headaches.   Psychiatric/Behavioral:  Negative for sleep disturbance. The patient is not nervous/anxious.      Past Medical History:   Diagnosis Date    Annual physical exam 2020    Annual physical exam 2020    GERD (gastroesophageal reflux disease)     Hyperlipidemia     Hypertension     Major depressive disorder, single episode, moderate (HCC) 2021    PER CMS/ICD 10 CODING GUIDELINES - per provider approval     Past Surgical History:   Procedure Laterality Date    NO PAST  "SURGERIES      VASECTOMY       Social History     Socioeconomic History    Marital status: /Civil Union     Spouse name: Not on file    Number of children: Not on file    Years of education: Not on file    Highest education level: Not on file   Occupational History    Not on file   Tobacco Use    Smoking status: Former     Current packs/day: 0.00     Types: Cigarettes     Quit date:      Years since quittin.0    Smokeless tobacco: Never   Vaping Use    Vaping status: Never Used   Substance and Sexual Activity    Alcohol use: Not Currently     Comment: occassionally    Drug use: Not Currently    Sexual activity: Not on file   Other Topics Concern    Not on file   Social History Narrative    Not on file     Social Drivers of Health     Financial Resource Strain: Not on file   Food Insecurity: Not on file   Transportation Needs: Not on file   Physical Activity: Not on file   Stress: Not on file   Social Connections: Not on file   Intimate Partner Violence: Not on file   Housing Stability: Not on file     No Known Allergies    Objective   /78   Pulse 62   Temp 97.5 °F (36.4 °C) (Temporal)   Resp 18   Ht 5' 11\" (1.803 m)   Wt 115 kg (254 lb 9.6 oz)   SpO2 97%   BMI 35.51 kg/m²      Physical Exam  Vitals and nursing note reviewed.   Constitutional:       General: He is not in acute distress.     Appearance: Normal appearance. He is not ill-appearing, toxic-appearing or diaphoretic.   HENT:      Head: Normocephalic and atraumatic.      Right Ear: External ear normal.      Left Ear: External ear normal.      Nose: Nose normal.      Mouth/Throat:      Mouth: Mucous membranes are moist.   Eyes:      General: No scleral icterus.  Cardiovascular:      Rate and Rhythm: Normal rate and regular rhythm.      Pulses: Normal pulses.      Heart sounds: Normal heart sounds.   Pulmonary:      Effort: Pulmonary effort is normal. No respiratory distress.      Breath sounds: Normal breath sounds. No wheezing. "   Abdominal:      General: Bowel sounds are normal. There is no distension.      Palpations: Abdomen is soft.      Tenderness: There is no abdominal tenderness.   Musculoskeletal:         General: Normal range of motion.      Cervical back: Normal range of motion and neck supple.      Right lower leg: No edema.      Left lower leg: No edema.   Lymphadenopathy:      Cervical: No cervical adenopathy.   Skin:     General: Skin is warm and dry.      Coloration: Skin is not jaundiced or pale.   Neurological:      General: No focal deficit present.      Mental Status: He is alert and oriented to person, place, and time. Mental status is at baseline.      Cranial Nerves: No cranial nerve deficit.      Sensory: No sensory deficit.   Psychiatric:         Mood and Affect: Mood normal.         Behavior: Behavior normal.         Thought Content: Thought content normal.         Judgment: Judgment normal.

## 2025-01-27 NOTE — ASSESSMENT & PLAN NOTE
Orders:    Lipid panel; Future    Comprehensive metabolic panel; Future  Lo fat diet and increase activity/exercise

## 2025-02-04 ENCOUNTER — APPOINTMENT (OUTPATIENT)
Dept: LAB | Facility: CLINIC | Age: 55
End: 2025-02-04
Payer: COMMERCIAL

## 2025-02-04 DIAGNOSIS — E78.5 HYPERLIPIDEMIA, UNSPECIFIED HYPERLIPIDEMIA TYPE: ICD-10-CM

## 2025-02-04 LAB
ALBUMIN SERPL BCG-MCNC: 4.5 G/DL (ref 3.5–5)
ALP SERPL-CCNC: 76 U/L (ref 34–104)
ALT SERPL W P-5'-P-CCNC: 38 U/L (ref 7–52)
ANION GAP SERPL CALCULATED.3IONS-SCNC: 8 MMOL/L (ref 4–13)
AST SERPL W P-5'-P-CCNC: 30 U/L (ref 13–39)
BILIRUB SERPL-MCNC: 1.22 MG/DL (ref 0.2–1)
BUN SERPL-MCNC: 14 MG/DL (ref 5–25)
CALCIUM SERPL-MCNC: 9.8 MG/DL (ref 8.4–10.2)
CHLORIDE SERPL-SCNC: 104 MMOL/L (ref 96–108)
CHOLEST SERPL-MCNC: 134 MG/DL (ref ?–200)
CO2 SERPL-SCNC: 27 MMOL/L (ref 21–32)
CREAT SERPL-MCNC: 0.89 MG/DL (ref 0.6–1.3)
GFR SERPL CREATININE-BSD FRML MDRD: 95 ML/MIN/1.73SQ M
GLUCOSE P FAST SERPL-MCNC: 93 MG/DL (ref 65–99)
HDLC SERPL-MCNC: 49 MG/DL
LDLC SERPL CALC-MCNC: 69 MG/DL (ref 0–100)
NONHDLC SERPL-MCNC: 85 MG/DL
POTASSIUM SERPL-SCNC: 4.4 MMOL/L (ref 3.5–5.3)
PROT SERPL-MCNC: 7.1 G/DL (ref 6.4–8.4)
SODIUM SERPL-SCNC: 139 MMOL/L (ref 135–147)
TRIGL SERPL-MCNC: 82 MG/DL (ref ?–150)

## 2025-02-04 PROCEDURE — 36415 COLL VENOUS BLD VENIPUNCTURE: CPT

## 2025-02-04 PROCEDURE — 80053 COMPREHEN METABOLIC PANEL: CPT

## 2025-02-04 PROCEDURE — 80061 LIPID PANEL: CPT

## 2025-02-05 ENCOUNTER — RESULTS FOLLOW-UP (OUTPATIENT)
Dept: FAMILY MEDICINE CLINIC | Facility: CLINIC | Age: 55
End: 2025-02-05

## 2025-03-24 DIAGNOSIS — F41.9 ANXIETY: ICD-10-CM

## 2025-03-25 RX ORDER — BUPROPION HYDROCHLORIDE 150 MG/1
150 TABLET ORAL EVERY MORNING
Qty: 90 TABLET | Refills: 1 | Status: SHIPPED | OUTPATIENT
Start: 2025-03-25 | End: 2025-09-21

## 2025-04-10 ENCOUNTER — PATIENT MESSAGE (OUTPATIENT)
Dept: FAMILY MEDICINE CLINIC | Facility: CLINIC | Age: 55
End: 2025-04-10

## 2025-04-11 DIAGNOSIS — N52.8 OTHER MALE ERECTILE DYSFUNCTION: Primary | ICD-10-CM

## 2025-04-12 ENCOUNTER — APPOINTMENT (OUTPATIENT)
Dept: LAB | Facility: MEDICAL CENTER | Age: 55
End: 2025-04-12
Payer: COMMERCIAL

## 2025-04-12 DIAGNOSIS — N52.8 OTHER MALE ERECTILE DYSFUNCTION: ICD-10-CM

## 2025-04-12 PROCEDURE — 84402 ASSAY OF FREE TESTOSTERONE: CPT

## 2025-04-12 PROCEDURE — 84403 ASSAY OF TOTAL TESTOSTERONE: CPT

## 2025-04-12 PROCEDURE — 36415 COLL VENOUS BLD VENIPUNCTURE: CPT

## 2025-04-15 ENCOUNTER — RESULTS FOLLOW-UP (OUTPATIENT)
Dept: FAMILY MEDICINE CLINIC | Facility: CLINIC | Age: 55
End: 2025-04-15

## 2025-04-15 DIAGNOSIS — N52.9 ERECTILE DYSFUNCTION, UNSPECIFIED ERECTILE DYSFUNCTION TYPE: Primary | ICD-10-CM

## 2025-04-15 LAB
TESTOST FREE SERPL-MCNC: 18.7 PG/ML (ref 7.2–24)
TESTOST SERPL-MCNC: 363 NG/DL (ref 264–916)

## 2025-04-15 RX ORDER — SILDENAFIL CITRATE 20 MG/1
20 TABLET ORAL DAILY PRN
Qty: 90 TABLET | Refills: 0 | Status: SHIPPED | OUTPATIENT
Start: 2025-04-15

## 2025-05-14 ENCOUNTER — OFFICE VISIT (OUTPATIENT)
Dept: FAMILY MEDICINE CLINIC | Facility: CLINIC | Age: 55
End: 2025-05-14
Payer: OTHER MISCELLANEOUS

## 2025-05-14 VITALS
BODY MASS INDEX: 36.4 KG/M2 | TEMPERATURE: 97.5 F | WEIGHT: 260 LBS | SYSTOLIC BLOOD PRESSURE: 126 MMHG | OXYGEN SATURATION: 98 % | HEIGHT: 71 IN | HEART RATE: 70 BPM | DIASTOLIC BLOOD PRESSURE: 78 MMHG | RESPIRATION RATE: 18 BRPM

## 2025-05-14 DIAGNOSIS — M54.50 ACUTE BILATERAL LOW BACK PAIN, UNSPECIFIED WHETHER SCIATICA PRESENT: Primary | ICD-10-CM

## 2025-05-14 DIAGNOSIS — R35.1 NOCTURIA: ICD-10-CM

## 2025-05-14 PROCEDURE — 99214 OFFICE O/P EST MOD 30 MIN: CPT | Performed by: INTERNAL MEDICINE

## 2025-05-14 NOTE — PROGRESS NOTES
Name: Walter Chopra      : 1970      MRN: [id number]  Encounter Provider: Nela Reynoso DO  Encounter Date: 2025   Encounter department: [facility]  :  Assessment & Plan  Acute bilateral low back pain, unspecified whether sciatica present    Orders:  •  Ambulatory Referral to Physical Therapy; Future  Stretching exercises   Limit lifting and pace activities like cutting the grass   Pt will schedule Pt eval   Nocturia  Limit caffeine and fluids after 7 pm   Monitor sxs and will followup at summer appt May need Urology followup PSA nl          Rto summer/prn      Depression Screening and Follow-up Plan: Patient was screened for depression during today's encounter. They screened negative with a PHQ-2 score of 0.      History of Present Illness   HPI  Pt had injury at work in December was lifting jugs of water and strained back he did see chiropractor for several weeks and it did improve He noted with increased activity as the weather changes that sxs recurring and are limiting He would like PT eval to strengthen and get exercise program he can do to limit sxs /prevent progression He had xray that did show some degenerative disc dz   Review of Systems   Constitutional:  Negative for chills and fever.   HENT: Negative.     Eyes:  Negative for visual disturbance.   Respiratory:  Negative for cough and shortness of breath.    Cardiovascular:  Negative for chest pain, palpitations and leg swelling.   Gastrointestinal:  Negative for abdominal distention and abdominal pain.   Genitourinary: Negative.    Musculoskeletal:  Positive for arthralgias and back pain.   Neurological:  Negative for dizziness, light-headedness and headaches.   Psychiatric/Behavioral:  Negative for sleep disturbance. The patient is not nervous/anxious.      Past Medical History:   Diagnosis Date   • Annual physical exam 2020   • Annual physical exam 2020   • GERD (gastroesophageal reflux disease)    •  "Hyperlipidemia    • Hypertension    • Major depressive disorder, single episode, moderate (HCC) 2021    PER CMS/ICD 10 CODING GUIDELINES - per provider approval     Past Surgical History:   Procedure Laterality Date   • NO PAST SURGERIES     • VASECTOMY       Social History     Socioeconomic History   • Marital status: /Civil Union     Spouse name: Not on file   • Number of children: Not on file   • Years of education: Not on file   • Highest education level: Not on file   Occupational History   • Not on file   Tobacco Use   • Smoking status: Former     Current packs/day: 0.00     Types: Cigarettes     Quit date:      Years since quittin.3   • Smokeless tobacco: Never   Vaping Use   • Vaping status: Never Used   Substance and Sexual Activity   • Alcohol use: Not Currently     Comment: occassionally   • Drug use: Not Currently   • Sexual activity: Not on file   Other Topics Concern   • Not on file   Social History Narrative   • Not on file     Social Drivers of Health     Financial Resource Strain: Not on file   Food Insecurity: Not on file   Transportation Needs: Not on file   Physical Activity: Not on file   Stress: Not on file   Social Connections: Not on file   Intimate Partner Violence: Not on file   Housing Stability: Not on file     No Known Allergies    Objective   /78   Pulse 70   Temp 97.5 °F (36.4 °C) (Temporal)   Resp 18   Ht 5' 11\" (1.803 m)   Wt 118 kg (260 lb)   SpO2 98%   BMI 36.26 kg/m²      Physical Exam  Vitals and nursing note reviewed.   Constitutional:       General: He is not in acute distress.     Appearance: Normal appearance. He is not ill-appearing, toxic-appearing or diaphoretic.   HENT:      Head: Normocephalic and atraumatic.      Right Ear: External ear normal.      Left Ear: External ear normal.      Nose: Nose normal.     Eyes:      General: No scleral icterus.      Cardiovascular:      Rate and Rhythm: Normal rate and regular rhythm.      Pulses: " Normal pulses.      Heart sounds: Normal heart sounds.   Pulmonary:      Effort: Pulmonary effort is normal.      Breath sounds: Normal breath sounds.   Abdominal:      General: There is no distension.      Palpations: Abdomen is soft.      Tenderness: There is no abdominal tenderness.     Musculoskeletal:         General: Tenderness present. No swelling or deformity.      Cervical back: Neck supple.      Right lower leg: No edema.      Left lower leg: No edema.      Comments: Palpable tenderness lumbar region b/l no warmth or redness   Decreased forward flexion due to discomfort   Lymphadenopathy:      Cervical: No cervical adenopathy.     Skin:     General: Skin is warm and dry.      Coloration: Skin is not jaundiced or pale.     Neurological:      General: No focal deficit present.      Mental Status: He is alert and oriented to person, place, and time. Mental status is at baseline.      Cranial Nerves: No cranial nerve deficit.      Sensory: No sensory deficit.      Motor: No weakness.      Coordination: Coordination normal.      Gait: Gait normal.      Deep Tendon Reflexes: Reflexes normal.     Psychiatric:         Mood and Affect: Mood normal.         Behavior: Behavior normal.         Thought Content: Thought content normal.         Judgment: Judgment normal.

## 2025-05-19 DIAGNOSIS — K21.9 GASTROESOPHAGEAL REFLUX DISEASE: ICD-10-CM

## 2025-05-19 RX ORDER — OMEPRAZOLE 20 MG/1
20 CAPSULE, DELAYED RELEASE ORAL DAILY
Qty: 90 CAPSULE | Refills: 1 | Status: SHIPPED | OUTPATIENT
Start: 2025-05-19

## 2025-05-20 ENCOUNTER — EVALUATION (OUTPATIENT)
Dept: PHYSICAL THERAPY | Facility: CLINIC | Age: 55
End: 2025-05-20
Payer: OTHER MISCELLANEOUS

## 2025-05-20 DIAGNOSIS — M54.50 ACUTE BILATERAL LOW BACK PAIN, UNSPECIFIED WHETHER SCIATICA PRESENT: Primary | ICD-10-CM

## 2025-05-20 PROCEDURE — 97161 PT EVAL LOW COMPLEX 20 MIN: CPT

## 2025-05-20 PROCEDURE — 97110 THERAPEUTIC EXERCISES: CPT

## 2025-05-20 NOTE — PROGRESS NOTES
PT Evaluation     Today's date: 2025  Patient name: Walter Chopra  : 1970  MRN: [id number]  Referring provider: Nela Reynoso DO  Dx:   Encounter Diagnosis     ICD-10-CM    1. Acute bilateral low back pain, unspecified whether sciatica present  M54.50                      Assessment  Impairments: abnormal or restricted ROM, abnormal movement, activity intolerance, impaired physical strength, lacks appropriate home exercise program, pain with function, poor posture  and activity limitations  Symptom irritability: moderate    Assessment details: Walter Chopra is a 54 y.o. male presenting to Physical Therapy today with the chief complaint of lumbar spine pain following a work related injury on . Upon completion of the initial PT evaluation the patient is presenting with limitations in thoracolumbar mobility, thoracolumbar strength, core conditioning, b/l hip mobility, postural mechanics, and pain. He is currently experiencing difficulty with activity such as bending, transfers, lifting, pushing, pulling, and carrying due to symptomatology. Patient would benefit from a course of skilled Physical Therapy services to address functional limitations to return to prior level of function. Thank you for your referral.   Understanding of Dx/Px/POC: excellent     Prognosis: good    Goals  STG: (6 weeks)   1.) Patient will initiate HEP Independently   2.) Patient will have a 50% reduction in overall symptoms   3.) Patient will demonstrate improved hip strength evidenced by 50% in all planes of motion   4.) Patient will demonstrate improved thoracolumbar mobility evidence by 50% in all planes of motion   5.) Patient will demonstrate improved activity tolerance to >/= 1 hour w/o symptoms    LTG: (12 weeks)   1.) Patient will be d/c to an HEP independently  2.) Patient will improve functional abilities evidenced by FOTO scores  3.) Eliminate pain with functional activity   4.) Patient will  demonstrate improved ability to lift, push, pull, and carry safely w/o symptoms   5.) Patient will demonstrate improved thoracolumbar mobility evidence by 90% in all planes of motion as evidence of restored function  6.) Patient will demonstrate improved hip strength evidenced by 90% in all planes of motion as evidence of restored function    Plan  Patient would benefit from: PT eval and skilled physical therapy  Referral necessary: No  Planned modality interventions: cryotherapy and thermotherapy: hydrocollator packs    Planned therapy interventions: functional ROM exercises, graded activity, graded exercise, graded motor, home exercise program, flexibility, joint mobilization, manual therapy, neuromuscular re-education, patient education, postural training, self care, strengthening, stretching, therapeutic activities, therapeutic exercise and therapeutic training    Frequency: 2x week  Duration in weeks: 8  Plan of Care beginning date: 5/20/2025  Plan of Care expiration date: 7/15/2025  Treatment plan discussed with: patient  Plan details: Plan of care was reviewed and discussed thoroughly with the patient on their current condition. Patient was instructed on a HEP with written instructions. Patient is in agreement with PT recommendations and will attend Physical Therapy 1-2x/week for the next 8 weeks to address current deficits.        Subjective Evaluation    History of Present Illness  Mechanism of injury: The patient reports today with L sided low back pain that started on December 17th after sustaining a work related injury. He notes filling water jugs in his work office and reports when he lifted the water jug he strained his low back. At the time of injury patient presented to the ED and was given pain medication. He had X-ray imaging that showed mild degenerative changes of the lumbar spine evidence by disc space narrowing. He denies numbness or tingling. He states receiving care from his Chiropractor  until  with mild relief of symptoms. He followed up with his PCP. Pain is located in L low back region and is aggravated by activity such as rising from a chair, bending, pushing, pulling, and lifting. He is now referred to OPPT.          Not a recurrent problem   Quality of life: good    Patient Goals  Patient goals for therapy: decreased pain, improved balance, increased motion, increased strength, independence with ADLs/IADLs and return to sport/leisure activities    Pain  Current pain ratin  At best pain rating: 3  At worst pain ratin  Location: L low back  Quality: sharp  Relieving factors: ice, rest and relaxation (chiro)  Aggravating factors: lifting (bending, transfers, pushing, pulling, carrying)  Progression: no change    Social Support    Employment status: working (Desk work)    Diagnostic Tests  X-ray: abnormal  Treatments  Current treatment: physical therapy      Objective     Concurrent Complaints  Positive for disturbed sleep. Negative for night pain, bladder dysfunction, bowel dysfunction and saddle (S4) numbness    Postural Observations  Seated posture: poor  Standing posture: poor    Additional Postural Observation Details  Patient demonstrated increased thoracic kyphosis, rounded shoulders, and forward head with postural mechanics.    Palpation   Left   Tenderness of the erector spinae, lumbar paraspinals and quadratus lumborum.     Additional Palpation Details  L sided L4-L5, L5-S1 region    Tenderness     Lumbar Spine  No tenderness in the spinous process, left transverse process and right transverse process.     Left Hip   Tenderness in the PSIS. No tenderness in the ASIS.     Right Hip   No tenderness in the ASIS and PSIS.     Neurological Testing     Sensation     Lumbar   Left   Intact: light touch    Right   Intact: light touch    Reflexes   Left   Patellar (L4): normal (2+)  Achilles (S1): normal (2+)    Right   Patellar (L4): normal (2+)  Achilles (S1): normal  (2+)    Active Range of Motion     Lumbar   Flexion:  with pain Restriction level: moderate  Extension:  with pain Restriction level: moderate  Left lateral flexion:  with pain Restriction level: maximal  Right lateral flexion:  Restriction level: minimal  Left rotation:  with pain Restriction level: moderate  Right rotation:  Restriction level: minimal    Strength/Myotome Testing     Left Hip   Planes of Motion   Flexion: 4  Extension: 4  Abduction: 4  Adduction: 4+  External rotation: 4-  Internal rotation: 4-    Right Hip   Planes of Motion   Flexion: 4+  Extension: 5  Abduction: 4+  Adduction: 4+  External rotation: 4  Internal rotation: 4    Left Knee   Flexion: 5  Extension: 5    Right Knee   Flexion: 5  Extension: 5    Left Ankle/Foot   Dorsiflexion: 5  Plantar flexion: 5  Inversion: 5  Eversion: 5    Right Ankle/Foot   Dorsiflexion: 5  Plantar flexion: 5  Inversion: 5  Eversion: 5    Muscle Activation     Additional Muscle Activation Details  Patient demonstrates decreased transverse abdominal activation.    Tests     Lumbar     Left   Negative crossed SLR and passive SLR.     Right   Negative crossed SLR and passive SLR.     Left Pelvic Girdle/Sacrum   Negative: active SLR test.     Right Pelvic Girdle/Sacrum   Negative: active SLR test.     Left Hip   Negative RYAN, FADIR and long sit.   Modified [place]: Positive.   90/90 SLR: Positive.     Right Hip   Negative RYAN, FADIR and long sit.   Modified [place]: Positive.   90/90 SLR: Positive.     General Comments:      Lumbar Comments  Patient was educated on injury management regarding ice/heat application, postural mechanics, body mechanics, pain, pathoanatomic's of injury, activity limitations, positioning, and PREP to manage symptoms.             Precautions: *Avoid Aggravating Activity and Factors at this time*      Manuals 5/20        LS PA Mobs         LS STM PS/QL                        Neuro Re-Ed        TA Draw-in         TA Draw-in w/ CC MACRINA       "  TA Draw-in w/ Anti-Rot                                        Ther Ex        PPU On Elbows x15       Standing Lumbar Extension w/ counter support  Reviewed        Hamstring Stretch  10\" Hold x5       Piriformis Stretch  10\" Hold x5        Postural Mechanics  5' Seated/Standing/Lifting       Lumbar Extension @ Wall         Trunk Extension Machine         TRX Squats         Hip Flexor Stretch         Standing Hip Ext         DKC         Seated Thoracolumbar Flexion w/ PB         HEP Instruction  BM        Ther Activity                        Gait Training                        Modalities        MHP  8' Seated                       "

## 2025-05-22 ENCOUNTER — OFFICE VISIT (OUTPATIENT)
Dept: PHYSICAL THERAPY | Facility: CLINIC | Age: 55
End: 2025-05-22
Attending: INTERNAL MEDICINE
Payer: OTHER MISCELLANEOUS

## 2025-05-22 DIAGNOSIS — M54.50 ACUTE BILATERAL LOW BACK PAIN, UNSPECIFIED WHETHER SCIATICA PRESENT: Primary | ICD-10-CM

## 2025-05-22 PROCEDURE — 97110 THERAPEUTIC EXERCISES: CPT

## 2025-05-22 NOTE — PROGRESS NOTES
"Daily Note     Today's date: 2025  Patient name: Walter Chopra  : 1970  MRN: [id number]  Referring provider: Nela Reynoso DO  Dx:   Encounter Diagnosis     ICD-10-CM    1. Acute bilateral low back pain, unspecified whether sciatica present  M54.50                      Subjective: Patient reports good compliance with performance of HEP since the initial PT evaluation. He states experiencing a slight improvement in his low back symptoms this far.      Objective: See treatment diary below      Assessment: Tolerated treatment well. PT progressed the current program with the addition of trunk extension strengthening and bilateral hip flexor static stretching with good tolerance. Patient demonstrated improved lumbar flexion mobility following unloaded lumbar extension on elbows. We will continue to progress patient within tolerance to address functional limitations to return to prior level of function. He would benefit from continued PT.      Plan: Continue per plan of care.      Precautions: *Avoid Aggravating Activity and Factors at this time*      Manuals       LS PA Mobs         LS STM PS/QL                        Neuro Re-Ed        TA Draw-in         TA Draw-in w/ CC MACRINA        TA Draw-in w/ Anti-Rot                                        Ther Ex        PPU On Elbows x15 On Elbows x15 w/ exhale      Standing Lumbar Extension w/ counter support  Reviewed  2x10       Hamstring Stretch  10\" Hold x5 10\" Hold x5 8\" step       Piriformis Stretch  10\" Hold x5  10\" Hold x5        Postural Mechanics  5' Seated/Standing/Lifting 5'       Lumbar Extension @ Wall         Trunk Extension Machine   P60 3x10       TRX Squats         Hip Flexor Stretch   PT Assist 10\" Hold x5 ea      Standing Hip Ext         DKC         Seated Thoracolumbar Flexion w/ PB         HEP Instruction  BM  BM       Ther Activity                        Gait Training                        Modalities        MHP  8' Seated  8' " Seated       Ice  5' Post

## 2025-05-27 ENCOUNTER — OFFICE VISIT (OUTPATIENT)
Dept: PHYSICAL THERAPY | Facility: CLINIC | Age: 55
End: 2025-05-27
Attending: INTERNAL MEDICINE
Payer: OTHER MISCELLANEOUS

## 2025-05-27 DIAGNOSIS — M54.50 ACUTE BILATERAL LOW BACK PAIN, UNSPECIFIED WHETHER SCIATICA PRESENT: Primary | ICD-10-CM

## 2025-05-27 PROCEDURE — 97110 THERAPEUTIC EXERCISES: CPT

## 2025-05-27 NOTE — PROGRESS NOTES
"Daily Note     Today's date: 2025  Patient name: Walter Chopra  : 1970  MRN: [id number]  Referring provider: Nela Reynoso DO  Dx:   Encounter Diagnosis     ICD-10-CM    1. Acute bilateral low back pain, unspecified whether sciatica present  M54.50                      Subjective: Patient reports a mild flare up of low back symptoms with placing his AC units over the weekend. He notes home exercise program helped to manage symptoms.      Objective: See treatment diary below      Assessment: Tolerated treatment well. PT progressed the current program with the addition of proximal bilateral hip strengthening with good tolerance. Patient is responding well to PT treatment this far and is making appropriate progressions with the current program. He would benefit from continued PT.      Plan: Continue per plan of care.      Precautions: *Avoid Aggravating Activity and Factors at this time*      Manuals      LS PA Mobs         LS STM PS/QL                        Neuro Re-Ed        TA Draw-in         TA Draw-in w/ CC MACRINA        TA Draw-in w/ Anti-Rot                                        Ther Ex        PPU On Elbows x15 On Elbows x15 w/ exhale On Ebows x15 w/ exhale      Standing Lumbar Extension w/ counter support  Reviewed  2x10  2x10      Hamstring Stretch  10\" Hold x5 10\" Hold x5 8\" step  10\" Hold x5 8\" step      Piriformis Stretch  10\" Hold x5  10\" Hold x5   HEP      Postural Mechanics  5' Seated/Standing/Lifting 5'       Lumbar Extension @ Wall         Trunk Extension Machine   P60 3x10  P60 3x10      TRX Squats    2x10      Hip Flexor Stretch   PT Assist 10\" Hold x5 ea PT Assist 10\" Hold x5 ea     Standing Hip Ext         DKC         Seated Thoracolumbar Flexion w/ PB         HEP Instruction  BM  BM  BM     Ther Activity                        Gait Training                        Modalities        MHP  8' Seated  8' Seated  8' seated     Ice  5' Post  5' post                   "

## 2025-05-27 NOTE — PROGRESS NOTES
"Daily Note     Today's date: 2025  Patient name: Walter Chopra  : 1970  MRN: [id number]  Referring provider: Nela Reynoso DO  Dx:   Encounter Diagnosis     ICD-10-CM    1. Acute bilateral low back pain, unspecified whether sciatica present  M54.50                      Subjective: ***      Objective: See treatment diary below      Assessment: Tolerated treatment {Tolerated treatment :}. Patient {assessment:}      Plan: {PLAN:1209251542}     Precautions: *Avoid Aggravating Activity and Factors at this time*      Manuals       LS PA Mobs         LS STM PS/QL                        Neuro Re-Ed        TA Draw-in         TA Draw-in w/ CC MACRINA        TA Draw-in w/ Anti-Rot                                        Ther Ex        PPU On Elbows x15 On Elbows x15 w/ exhale      Standing Lumbar Extension w/ counter support  Reviewed  2x10       Hamstring Stretch  10\" Hold x5 10\" Hold x5 8\" step       Piriformis Stretch  10\" Hold x5  10\" Hold x5        Postural Mechanics  5' Seated/Standing/Lifting 5'       Lumbar Extension @ Wall         Trunk Extension Machine   P60 3x10       TRX Squats         Hip Flexor Stretch   PT Assist 10\" Hold x5 ea      Standing Hip Ext         DKC         Seated Thoracolumbar Flexion w/ PB         HEP Instruction  BM  BM       Ther Activity                        Gait Training                        Modalities        MHP  8' Seated  8' Seated       Ice  5' Post                     "

## 2025-05-29 ENCOUNTER — OFFICE VISIT (OUTPATIENT)
Dept: PHYSICAL THERAPY | Facility: CLINIC | Age: 55
End: 2025-05-29
Attending: INTERNAL MEDICINE
Payer: OTHER MISCELLANEOUS

## 2025-05-29 DIAGNOSIS — M54.50 ACUTE BILATERAL LOW BACK PAIN, UNSPECIFIED WHETHER SCIATICA PRESENT: Primary | ICD-10-CM

## 2025-05-29 PROCEDURE — 97110 THERAPEUTIC EXERCISES: CPT

## 2025-05-29 NOTE — PROGRESS NOTES
"Daily Note     Today's date: 2025  Patient name: Walter Chopra  : 1970  MRN: [id number]  Referring provider: Nela Reynoso DO  Dx:   Encounter Diagnosis     ICD-10-CM    1. Acute bilateral low back pain, unspecified whether sciatica present  M54.50                      Subjective: ***      Objective: See treatment diary below      Assessment: Tolerated treatment {Tolerated treatment :}. Patient {assessment:}      Plan: {PLAN:6333710107}     Precautions: *Avoid Aggravating Activity and Factors at this time*      Manuals      LS PA Mobs         LS STM PS/QL                        Neuro Re-Ed        TA Draw-in         TA Draw-in w/ CC MACRINA        TA Draw-in w/ Anti-Rot                                        Ther Ex        PPU On Elbows x15 On Elbows x15 w/ exhale On Ebows x15 w/ exhale      Standing Lumbar Extension w/ counter support  Reviewed  2x10  2x10      Hamstring Stretch  10\" Hold x5 10\" Hold x5 8\" step  10\" Hold x5 8\" step      Piriformis Stretch  10\" Hold x5  10\" Hold x5   HEP      Postural Mechanics  5' Seated/Standing/Lifting 5'       Lumbar Extension @ Wall         Trunk Extension Machine   P60 3x10  P60 3x10      TRX Squats    2x10      Hip Flexor Stretch   PT Assist 10\" Hold x5 ea PT Assist 10\" Hold x5 ea     Standing Hip Ext         DKC         Seated Thoracolumbar Flexion w/ PB         HEP Instruction  BM  BM  BM     Ther Activity                        Gait Training                        Modalities        MHP  8' Seated  8' Seated  8' seated     Ice  5' Post  5' post                     "

## 2025-05-29 NOTE — PROGRESS NOTES
"Daily Note     Today's date: 2025  Patient name: Walter Chopra  : 1970  MRN: [id number]  Referring provider: Nela Reynoso DO  Dx:   Encounter Diagnosis     ICD-10-CM    1. Acute bilateral low back pain, unspecified whether sciatica present  M54.50                      Subjective: Patient reports increased anterior thigh and low back soreness following last treatment session.      Objective: See treatment diary below      Assessment: Tolerated treatment well. PT progressed the current program with the addition of scapulothoracic strengthening with good tolerance. We will continue to progress patient within tolerance to address functional deficits to return to prior level of function. Patient would benefit from continued PT.      Plan: Continue per plan of care.      Precautions: *Avoid Aggravating Activity and Factors at this time*      Manuals      LS PA Mobs         LS STM PS/QL                        Neuro Re-Ed        TA Draw-in         TA Draw-in w/ CC MACRINA        TA Draw-in w/ Anti-Rot                                        Ther Ex        PPU On Elbows x15 On Elbows x15 w/ exhale On Ebows x15 w/ exhale  On Elbows x15 w/ exhale     Standing Lumbar Extension w/ counter support  Reviewed  2x10  2x10  2x10     Hamstring Stretch  10\" Hold x5 10\" Hold x5 8\" step  10\" Hold x5 8\" step  10\" Hold x5 8\" step     Piriformis Stretch  10\" Hold x5  10\" Hold x5   HEP      Postural Mechanics  5' Seated/Standing/Lifting 5'       Lumbar Extension @ Wall         Trunk Extension Machine   P60 3x10  P60 3x10  P60 3x10     TRX Squats    2x10  2x10     Hip Flexor Stretch   PT Assist 10\" Hold x5 ea PT Assist 10\" Hold x5 ea PT Assist 10\" Hold x5 ea    Machine Retractions     P2 2x10     Standing Hip Ext         DKC         Seated Thoracolumbar Flexion w/ PB         HEP Instruction  BM  BM  BM     Ther Activity                        Gait Training                        Modalities        MHP  8' " Seated  8' Seated  8' seated 8' Seated     Ice  5' Post  5' post 5' Post

## 2025-06-03 ENCOUNTER — OFFICE VISIT (OUTPATIENT)
Dept: PHYSICAL THERAPY | Facility: CLINIC | Age: 55
End: 2025-06-03
Attending: INTERNAL MEDICINE
Payer: OTHER MISCELLANEOUS

## 2025-06-03 DIAGNOSIS — M54.50 ACUTE BILATERAL LOW BACK PAIN, UNSPECIFIED WHETHER SCIATICA PRESENT: Primary | ICD-10-CM

## 2025-06-03 PROCEDURE — 97110 THERAPEUTIC EXERCISES: CPT

## 2025-06-03 NOTE — PROGRESS NOTES
"Daily Note     Today's date: 6/3/2025  Patient name: Walter Chopra  : 1970  MRN: [id number]  Referring provider: Nela Reynoso DO  Dx:   Encounter Diagnosis     ICD-10-CM    1. Acute bilateral low back pain, unspecified whether sciatica present  M54.50                      Subjective: Patient reports continued improvement in low back pain with PT treatment.      Objective: See treatment diary below      Assessment: Tolerated treatment well. PT progressed the current program in unloaded lumbar extension ROM and with the addition of core conditioning with good tolerance. Patient exhibited good technique with therapeutic exercises and would benefit from continued PT.       Plan: Continue per plan of care.      Precautions: *Avoid Aggravating Activity and Factors at this time*      Manuals 5/20  5/22 5/27 5/29  6/3   LS PA Mobs         LS STM PS/QL        LAD L      BM            Neuro Re-Ed        TA Draw-in         TA Draw-in w/ CC MACRINA     P4 2x10    TA Draw-in w/ Anti-Rot                                        Ther Ex        PPU On Elbows x15 On Elbows x15 w/ exhale On Ebows x15 w/ exhale  On Elbows x15 w/ exhale  X15 w/ exhale    Standing Lumbar Extension w/ counter support  Reviewed  2x10  2x10  2x10  2x10    Hamstring Stretch  10\" Hold x5 10\" Hold x5 8\" step  10\" Hold x5 8\" step  10\" Hold x5 8\" step  10\" Hold x5 8\" step    Piriformis Stretch  10\" Hold x5  10\" Hold x5   HEP      Postural Mechanics  5' Seated/Standing/Lifting 5'       Lumbar Extension @ Wall         Trunk Extension Machine   P60 3x10  P60 3x10  P60 3x10  P60 3x10    TRX Squats    2x10  2x10  2x10    Hip Flexor Stretch   PT Assist 10\" Hold x5 ea PT Assist 10\" Hold x5 ea PT Assist 10\" Hold x5 ea PT Assist 10\" Hold x5 ea   Machine Retractions     P2 2x10  P2 2x10    Standing Hip Ext         DKC         Seated Thoracolumbar Flexion w/ PB         HEP Instruction  BM  BM  BM  BM    Ther Activity                        Gait Training          "               Modalities        MHP  8' Seated  8' Seated  8' seated 8' Seated  8' Seated    Ice  5' Post  5' post 5' Post  5' Post

## 2025-06-05 ENCOUNTER — OFFICE VISIT (OUTPATIENT)
Dept: PHYSICAL THERAPY | Facility: CLINIC | Age: 55
End: 2025-06-05
Attending: INTERNAL MEDICINE
Payer: OTHER MISCELLANEOUS

## 2025-06-05 DIAGNOSIS — M54.50 ACUTE BILATERAL LOW BACK PAIN, UNSPECIFIED WHETHER SCIATICA PRESENT: Primary | ICD-10-CM

## 2025-06-05 PROCEDURE — 97110 THERAPEUTIC EXERCISES: CPT

## 2025-06-05 NOTE — PROGRESS NOTES
"Daily Note     Today's date: 2025  Patient name: Walter Chopra  : 1970  MRN: [id number]  Referring provider: Nela Reynoso DO  Dx:   Encounter Diagnosis     ICD-10-CM    1. Acute bilateral low back pain, unspecified whether sciatica present  M54.50                    Subjective: Patient reports soreness in the low back following last treatment session.       Objective: See treatment diary below      Assessment: Tolerated treatment well with a minimal increase in symptomatology. PT progressed the current program with the addition of increased resistance with scapulothoracic strengthening with good tolerance. Patient would benefit from continued PT to address functional limitations.       Plan: Continue per plan of care.      Precautions: *Avoid Aggravating Activity and Factors at this time*      Manuals 6/5 5/22 5/27 5/29  6/3   LS PA Mobs         LS STM PS/QL        LAD L      BM            Neuro Re-Ed        TA Draw-in         TA Draw-in w/ CC MACRINA P4 2x10     P4 2x10    TA Draw-in w/ Anti-Rot                                        Ther Ex        PPU X15 w/ exhale  On Elbows x15 w/ exhale On Ebows x15 w/ exhale  On Elbows x15 w/ exhale  X15 w/ exhale    Standing Lumbar Extension w/ counter support  2x10  2x10  2x10  2x10  2x10    Hamstring Stretch  10\" Hold x5 8\" step  10\" Hold x5 8\" step  10\" Hold x5 8\" step  10\" Hold x5 8\" step  10\" Hold x5 8\" step    Piriformis Stretch   10\" Hold x5   HEP      Postural Mechanics   5'       Lumbar Extension @ Wall         Trunk Extension Machine  P60 3x10  P60 3x10  P60 3x10  P60 3x10  P60 3x10    TRX Squats  2x10  2x10  2x10  2x10    Hip Flexor Stretch  PT Assist 10\" Hold x5 ea PT Assist 10\" Hold x5 ea PT Assist 10\" Hold x5 ea PT Assist 10\" Hold x5 ea PT Assist 10\" Hold x5 ea   Machine Retractions  P3 2x10    P2 2x10  P2 2x10    Standing Hip Ext         DKC         Seated Thoracolumbar Flexion w/ PB         HEP Instruction  BM  BM  BM  BM    Ther Activity      "                   Gait Training                        Modalities        MHP  8' Seated  8' Seated  8' seated 8' Seated  8' Seated    Ice 5' Post  5' Post  5' post 5' Post  5' Post

## 2025-06-10 ENCOUNTER — OFFICE VISIT (OUTPATIENT)
Dept: PHYSICAL THERAPY | Facility: CLINIC | Age: 55
End: 2025-06-10
Attending: INTERNAL MEDICINE
Payer: OTHER MISCELLANEOUS

## 2025-06-10 DIAGNOSIS — M54.50 ACUTE BILATERAL LOW BACK PAIN, UNSPECIFIED WHETHER SCIATICA PRESENT: Primary | ICD-10-CM

## 2025-06-10 PROCEDURE — 97110 THERAPEUTIC EXERCISES: CPT

## 2025-06-10 NOTE — PROGRESS NOTES
"Daily Note     Today's date: 6/10/2025  Patient name: Walter Chopra  : 1970  MRN: [id number]  Referring provider: Nela Reynoso DO  Dx:   Encounter Diagnosis     ICD-10-CM    1. Acute bilateral low back pain, unspecified whether sciatica present  M54.50                      Subjective: Patient reports an increase in low back symptoms that started on Thursday of last week into the weekend. He mentioned that symptoms are starting to improve since yesterday.    Objective: See treatment diary below      Assessment: Tolerated treatment well with a minimal increase in lumbar spine symptomatology. PT addressed low back symptoms with manual therapy with good response. We modified treatment session with less resistance with good tolerance. Patient would benefit from continued PT to increase strength and core stability to improve functional limitations to return to prior level.      Plan: Continue per plan of care.      Precautions: *Avoid Aggravating Activity and Factors at this time*      Manuals 6/5 6/10 5/27 5/29  6/3   LS PA Mobs   BM       LS STM PS/QL        LAD L   BM    BM            Neuro Re-Ed        TA Draw-in         TA Draw-in w/ CC MACRINA P4 2x10  P4 2x10   P4 2x10    TA Draw-in w/ Anti-Rot                                        Ther Ex        PPU X15 w/ exhale  X15 w/ exhale On Ebows x15 w/ exhale  On Elbows x15 w/ exhale  X15 w/ exhale    Standing Lumbar Extension w/ counter support  2x10  2x10 2x10  2x10  2x10    Hamstring Stretch  10\" Hold x5 8\" step  10\" Hold x5 8\" step 10\" Hold x5 8\" step  10\" Hold x5 8\" step  10\" Hold x5 8\" step    Piriformis Stretch    HEP      Postural Mechanics         Lumbar Extension @ Wall         Trunk Extension Machine   3,11,B1 P60 3x10  P60   3x10 P60 3x10  P60 3x10  P60 3x10    TRX Squats  2x10 NT 2x10  2x10  2x10    Hip Flexor Stretch  PT Assist 10\" Hold x5 ea PT Assist 10\" Hold  PT Assist 10\" Hold x5 ea PT Assist 10\" Hold x5 ea PT Assist 10\" Hold x5 ea   Machine " Retractions   7,1 P3 2x10  P2 2x10  P2 2x10  P2 2x10    Standing Hip Ext         DKC         Seated Thoracolumbar Flexion w/ PB         HEP Instruction  BM   BM  BM    Ther Activity                        Gait Training                        Modalities        MHP  8' Seated  8' Seated 8' seated 8' Seated  8' Seated    Ice 5' Post  5' post  5' post 5' Post  5' Post

## 2025-06-11 ENCOUNTER — OFFICE VISIT (OUTPATIENT)
Dept: PHYSICAL THERAPY | Facility: CLINIC | Age: 55
End: 2025-06-11
Attending: INTERNAL MEDICINE
Payer: OTHER MISCELLANEOUS

## 2025-06-11 DIAGNOSIS — M54.50 ACUTE BILATERAL LOW BACK PAIN, UNSPECIFIED WHETHER SCIATICA PRESENT: Primary | ICD-10-CM

## 2025-06-11 PROCEDURE — 97110 THERAPEUTIC EXERCISES: CPT

## 2025-06-11 NOTE — PROGRESS NOTES
"Daily Note     Today's date: 2025  Patient name: Walter Chopra  : 1970  MRN: [id number]  Referring provider: Nela Reynoso DO  Dx:   Encounter Diagnosis     ICD-10-CM    1. Acute bilateral low back pain, unspecified whether sciatica present  M54.50                      Subjective: Patient reports a slight increase in L anterior thigh discomfort since last treatment session.       Objective: See treatment diary below      Assessment: Tolerated treatment well. We progressed the current program with the addition of unloaded lumbar flexion with good tolerance. Patient exhibited good technique with therapeutic exercises and would benefit from continued PT to address functional limitations.       Plan: Continue per plan of care.      Precautions: *Avoid Aggravating Activity and Factors at this time*      Manuals 6/5 6/10 6/11 5/29  6/3   LS PA Mobs   BM       LS STM PS/QL        LAD L   BM  BM   BM            Neuro Re-Ed        TA Draw-in         TA Draw-in w/ CC MACRINA P4 2x10  P4 2x10 P4 2x10   P4 2x10    TA Draw-in w/ Anti-Rot                                        Ther Ex        PPU X15 w/ exhale  X15 w/ exhale On Ebows x15 w/ exhale  On Elbows x15 w/ exhale  X15 w/ exhale    Standing Lumbar Extension w/ counter support  2x10  2x10 2x10  2x10  2x10    Hamstring Stretch  10\" Hold x5 8\" step  10\" Hold x5 8\" step 10\" Hold x5 8\" step  10\" Hold x5 8\" step  10\" Hold x5 8\" step    Piriformis Stretch    HEP      Postural Mechanics         Lumbar Extension @ Wall         Trunk Extension Machine   3,11,B1 P60 3x10  P60   3x10 P60 3x10  P60 3x10  P60 3x10    TRX Squats  2x10 NT  2x10  2x10    Hip Flexor Stretch  PT Assist 10\" Hold x5 ea PT Assist 10\" Hold  PT Assist 10\" Hold x5 ea PT Assist 10\" Hold x5 ea PT Assist 10\" Hold x5 ea   Machine Retractions   7,1 P3 2x10  P2 2x10 P2 2x10  P2 2x10  P2 2x10    Standing Hip Ext         SKC    10\" Hold x5     Seated Thoracolumbar Flexion w/ PB         HEP Instruction  BM  "  BM  BM    Ther Activity                        Gait Training                        Modalities        MHP  8' Seated  8' Seated 8' seated 8' Seated  8' Seated    Ice 5' Post  5' post  5' post 5' Post  5' Post

## 2025-06-12 ENCOUNTER — APPOINTMENT (OUTPATIENT)
Dept: PHYSICAL THERAPY | Facility: CLINIC | Age: 55
End: 2025-06-12
Attending: INTERNAL MEDICINE
Payer: OTHER MISCELLANEOUS

## 2025-06-17 ENCOUNTER — OFFICE VISIT (OUTPATIENT)
Dept: PHYSICAL THERAPY | Facility: CLINIC | Age: 55
End: 2025-06-17
Attending: INTERNAL MEDICINE
Payer: OTHER MISCELLANEOUS

## 2025-06-17 DIAGNOSIS — M54.50 ACUTE BILATERAL LOW BACK PAIN, UNSPECIFIED WHETHER SCIATICA PRESENT: Primary | ICD-10-CM

## 2025-06-17 PROCEDURE — 97110 THERAPEUTIC EXERCISES: CPT

## 2025-06-17 NOTE — PROGRESS NOTES
"Daily Note     Today's date: 2025  Patient name: Walter Chopra  : 1970  MRN: [id number]  Referring provider: Nela Reynoso DO  Dx:   Encounter Diagnosis     ICD-10-CM    1. Acute bilateral low back pain, unspecified whether sciatica present  M54.50                      Subjective: Patient states low back pain is minimal today.      Objective: See treatment diary below      Assessment: Tolerated treatment well. We progressed the current program by adding thoracolumbar stretches to improve ROM with good tolerance. Patient would benefit from continued PT to improve ROM, strength, and core stability to return to prior level of function.      Plan: Continue per plan of care.      Precautions: *Avoid Aggravating Activity and Factors at this time*      Manuals 6/5 6/10 6/11 6/17 6/3   LS PA Mobs   BM       LS STM PS/QL        LAD L   BM  BM  BM BM            Neuro Re-Ed        TA Draw-in         TA Draw-in w/ CC MACRINA P4 2x10  P4 2x10 P4 2x10  P4 2x10 P4 2x10    TA Draw-in w/ Anti-Rot                                        Ther Ex        PPU X15 w/ exhale  X15 w/ exhale On Ebows x15 w/ exhale  On elbows x15 w/ exhale X15 w/ exhale    Standing Lumbar Extension w/ counter support  2x10  2x10 2x10  2x10 2x10    Hamstring Stretch  10\" Hold x5 8\" step  10\" Hold x5 8\" step 10\" Hold x5 8\" step  10\" Holdx5 8\" step 10\" Hold x5 8\" step    Piriformis Stretch    HEP      Postural Mechanics         Lumbar Extension @ Wall         Trunk Extension Machine   3,11,B1 P60 3x10  P60   3x10 P60 3x10  P60 3x10 P60 3x10    TRX Squats  2x10 NT   2x10    Hip Flexor Stretch  PT Assist 10\" Hold x5 ea PT Assist 10\" Hold  PT Assist 10\" Hold x5 ea PT Assist 10\" Hold x5 ea PT Assist 10\" Hold x5 ea   Machine Retractions   7,1 P3 2x10  P2 2x10 P2 2x10  P2 2x10 P2 2x10    Standing Hip Ext         SKC    10\" Hold x5 10\" Holdx5    Seated Thoracolumbar Flexion w/ PB     5\" Hold x5 ea    HEP Instruction  BM   BM  BM    Ther Activity          "               Gait Training                        Modalities        MHP  8' Seated  8' Seated 8' seated 8' Seated  8' Seated    Ice 5' Post  5' post  5' post 5' Post 5' Post

## 2025-06-19 ENCOUNTER — EVALUATION (OUTPATIENT)
Dept: PHYSICAL THERAPY | Facility: CLINIC | Age: 55
End: 2025-06-19
Attending: INTERNAL MEDICINE
Payer: OTHER MISCELLANEOUS

## 2025-06-19 DIAGNOSIS — M54.50 ACUTE BILATERAL LOW BACK PAIN, UNSPECIFIED WHETHER SCIATICA PRESENT: Primary | ICD-10-CM

## 2025-06-19 PROCEDURE — 97110 THERAPEUTIC EXERCISES: CPT

## 2025-06-19 NOTE — PROGRESS NOTES
PT Re-Evaluation     Today's date: 2025  Patient name: Walter Chopra  : 1970  MRN: [id number]  Referring provider: Nela Reynoso DO  Dx:   Encounter Diagnosis     ICD-10-CM    1. Acute bilateral low back pain, unspecified whether sciatica present  M54.50                      Assessment  Impairments: abnormal or restricted ROM, abnormal movement, activity intolerance, impaired physical strength, pain with function, poor posture  and activity limitations  Symptom irritability: moderate    Assessment details: Walter Chopra is a 54 y.o. male presenting to Physical Therapy today with the chief complaint of lumbar spine pain following a work related injury on . Patient is making steady progressions with Physical Therapy treatment. Upon completion of the PT re-evaluation the patient is continuing to present with limitations in thoracolumbar mobility, thoracolumbar strength, core conditioning, b/l hip mobility, and pain. Patient would benefit from continued skilled Physical Therapy services to address functional limitations to return to prior level of function. Thank you for your referral.   Understanding of Dx/Px/POC: excellent     Prognosis: good    Goals  STG: (6 weeks)   1.) Patient will initiate HEP Independently MET   2.) Patient will have a 50% reduction in overall symptoms PROGRESSING  3.) Patient will demonstrate improved hip strength evidenced by 50% in all planes of motion MET  4.) Patient will demonstrate improved thoracolumbar mobility evidence by 50% in all planes of motion MET  5.) Patient will demonstrate improved activity tolerance to >/= 1 hour w/o symptoms PROGRESSING    LTG: (12 weeks)   1.) Patient will be d/c to an HEP independently PROGRESSING  2.) Patient will improve functional abilities evidenced by FOTO scores MET  3.) Eliminate pain with functional activity PROGRESSING  4.) Patient will demonstrate improved ability to lift, push, pull, and carry safely w/o  symptoms  PROGRESSING  5.) Patient will demonstrate improved thoracolumbar mobility evidence by 90% in all planes of motion as evidence of restored function PROGRESSING  6.) Patient will demonstrate improved hip strength evidenced by 90% in all planes of motion as evidence of restored function PROGRESSING    Plan  Patient would benefit from: skilled physical therapy  Referral necessary: No  Planned modality interventions: cryotherapy and thermotherapy: hydrocollator packs    Planned therapy interventions: functional ROM exercises, graded activity, graded exercise, graded motor, home exercise program, flexibility, joint mobilization, manual therapy, neuromuscular re-education, patient education, postural training, self care, strengthening, stretching, therapeutic activities, therapeutic exercise and therapeutic training    Frequency: 1-2x week  Duration in weeks: 8  Plan of Care beginning date: 5/20/2025  Plan of Care expiration date: 7/15/2025  Treatment plan discussed with: patient  Plan details: Plan of care was reviewed and discussed thoroughly with the patient on their current condition. Patient was instructed on a HEP with written instructions.         Subjective Evaluation    History of Present Illness  Mechanism of injury: The patient reports today with L sided low back pain that started on December 17th after sustaining a work related injury. He notes filling water jugs in his work office and reports when he lifted the water jug he strained his low back. At the time of injury patient presented to the ED and was given pain medication. He had X-ray imaging that showed mild degenerative changes of the lumbar spine evidence by disc space narrowing. He denies numbness or tingling. He states receiving care from his Chiropractor until April 16th with mild relief of symptoms. He followed up with his PCP. Pain is located in L low back region and is aggravated by activity such as rising from a chair, bending, pushing,  pulling, and lifting. He is now referred to OPPT.    Update 25: The patient reports today with a 70-80% improvement in low back function and symptoms since the start of Physical Therapy treatment. He reports still having difficulty with activity such as bending, lifting, carrying, pushing, and pulling. He is happy with his continued progress.          Not a recurrent problem   Quality of life: good    Patient Goals  Patient goals for therapy: decreased pain, improved balance, increased motion, increased strength, independence with ADLs/IADLs and return to sport/leisure activities    Pain  Current pain rating: 3  At best pain rating: 3  At worst pain ratin  Location: L low back  Quality: sharp  Relieving factors: ice, rest and relaxation (chiro)  Aggravating factors: lifting (bending, pushing, pulling, carrying)  Progression: no change    Social Support    Employment status: working (Desk work)    Diagnostic Tests  X-ray: abnormal  Treatments  Current treatment: physical therapy      Objective     Concurrent Complaints  Negative for night pain, disturbed sleep, bladder dysfunction, bowel dysfunction and saddle (S4) numbness    Postural Observations  Seated posture: fair  Standing posture: fair    Additional Postural Observation Details  Patient demonstrates improvement in postural mechanics.    Palpation   Left   Tenderness of the erector spinae, lumbar paraspinals and quadratus lumborum.     Additional Palpation Details  L sided L4-L5, L5-S1 region    Tenderness     Lumbar Spine  No tenderness in the spinous process, left transverse process and right transverse process.     Left Hip   Tenderness in the PSIS. No tenderness in the ASIS.     Right Hip   No tenderness in the ASIS and PSIS.     Neurological Testing     Sensation     Lumbar   Left   Intact: light touch    Right   Intact: light touch    Reflexes   Left   Patellar (L4): normal (2+)  Achilles (S1): normal (2+)    Right   Patellar (L4): normal  (2+)  Achilles (S1): normal (2+)    Active Range of Motion     Lumbar   Flexion:  with pain Restriction level: minimal  Extension:  WFL  Left lateral flexion:  Restriction level: minimal  Right lateral flexion:  Restriction level: minimal  Left rotation:  with pain Restriction level: minimal  Right rotation:  Restriction level: minimal    Additional Active Range of Motion Details  Patient demonstrates improvement in all planes of mobility.    Strength/Myotome Testing     Left Hip   Planes of Motion   Flexion: 4+  Extension: 4+  Abduction: 4  Adduction: 4+  External rotation: 4+  Internal rotation: 4    Right Hip   Planes of Motion   Flexion: 5  Extension: 5  Abduction: 4+  Adduction: 4+  External rotation: 4  Internal rotation: 4    Left Knee   Flexion: 5  Extension: 5    Right Knee   Flexion: 5  Extension: 5    Left Ankle/Foot   Dorsiflexion: 5  Plantar flexion: 5  Inversion: 5  Eversion: 5    Right Ankle/Foot   Dorsiflexion: 5  Plantar flexion: 5  Inversion: 5  Eversion: 5    Muscle Activation     Additional Muscle Activation Details  Patient demonstrates improvement with transverse abdominal activation.    Tests     Lumbar     Left   Negative crossed SLR and passive SLR.     Right   Negative crossed SLR and passive SLR.     Left Pelvic Girdle/Sacrum   Negative: active SLR test.     Right Pelvic Girdle/Sacrum   Negative: active SLR test.     Left Hip   Negative RYAN, FADIR and long sit.   Modified [place]: Positive.   90/90 SLR: Positive.     Right Hip   Negative RYAN, FADIR and long sit.   Modified [place]: Positive.   90/90 SLR: Positive.     General Comments:      Lumbar Comments  Patient was educated on injury management regarding ice/heat application, postural mechanics, body mechanics, pain, pathoanatomic's of injury, activity limitations, positioning, and PREP to manage symptoms.             Precautions: *Avoid Aggravating Activity and Factors at this time*    Manuals 6/5 6/10 6/11 6/17 6/19   LS PA Mobs    "BM       LS STM PS/QL        LAD L   BM  BM  BM BM            Neuro Re-Ed        TA Draw-in         TA Draw-in w/ CC MACRINA P4 2x10  P4 2x10 P4 2x10  P4 2x10 P4 2x10    TA Draw-in w/ Anti-Rot                                        Ther Ex        PPU X15 w/ exhale  X15 w/ exhale On Ebows x15 w/ exhale  On elbows x15 w/ exhale X15 w/ exhale    Standing Lumbar Extension w/ counter support  2x10  2x10 2x10  2x10 2x10    Hamstring Stretch  10\" Hold x5 8\" step  10\" Hold x5 8\" step 10\" Hold x5 8\" step  10\" Holdx5 8\" step 10\" Hold x5 8\" step    Piriformis Stretch    HEP      Postural Mechanics         Lumbar Extension @ Wall         Trunk Extension Machine   3,11,B1 P60 3x10  P60   3x10 P60 3x10  P60 3x10 P60 3x10    TRX Squats  2x10 NT      Hip Flexor Stretch  PT Assist 10\" Hold x5 ea PT Assist 10\" Hold  PT Assist 10\" Hold x5 ea PT Assist 10\" Hold x5 ea PT Assist 10\" Hold x5 ea   Machine Retractions   7,1 P3 2x10  P2 2x10 P2 2x10  P2 2x10 P2 2x10    Standing Hip Ext         SKC    10\" Hold x5 10\" Holdx5 10\" Hold x5 DKC   Seated Thoracolumbar Flexion w/ PB     5\" Hold x5 ea 5\" Hold x10 ea   HEP Instruction  BM   BM  BM    Ther Activity                        Gait Training                        Modalities        MHP  8' Seated  8' Seated 8' seated 8' Seated  8' Seated    Ice 5' Post  5' post  5' post 5' Post 5' Post           "

## 2025-06-24 ENCOUNTER — OFFICE VISIT (OUTPATIENT)
Dept: PHYSICAL THERAPY | Facility: CLINIC | Age: 55
End: 2025-06-24
Attending: INTERNAL MEDICINE
Payer: OTHER MISCELLANEOUS

## 2025-06-24 DIAGNOSIS — M54.50 ACUTE BILATERAL LOW BACK PAIN, UNSPECIFIED WHETHER SCIATICA PRESENT: Primary | ICD-10-CM

## 2025-06-24 PROCEDURE — 97110 THERAPEUTIC EXERCISES: CPT

## 2025-06-24 NOTE — PROGRESS NOTES
"Daily Note     Today's date: 2025  Patient name: Walter Chopra  : 1970  MRN: [id number]  Referring provider: Nela Reynoso DO  Dx:   Encounter Diagnosis     ICD-10-CM    1. Acute bilateral low back pain, unspecified whether sciatica present  M54.50                      Subjective: Patient reports minimal low back pain this afternoon. He notes rising from a chair continues to improve. He states still having difficulty with performing strenuous work and any heavy lifting.      Objective: See treatment diary below      Assessment: Tolerated treatment well with a minimal increase in lumbar spine symptomatology. Patient is demonstrating appropriate progressions with thoracolumbar mobility in the sagittal plane. He would benefit from continued PT to address core conditioning, ROM, strength, and stability to decrease stress/strain on the lumbar spine.      Plan: Continue per plan of care.      Precautions: *Avoid Aggravating Activity and Factors at this time*    Manuals 6/24 6/10 6/11 6/17 6/19   LS PA Mobs   BM       LS STM PS/QL        LAD L  BM  BM  BM  BM BM            Neuro Re-Ed        TA Draw-in         TA Draw-in w/ CC MACRINA P4 2x10  P4 2x10   P4 2x10  P4 2x10 P4 2x10    TA Draw-in w/ Anti-Rot                                        Ther Ex        PPU X15 w/ exhale  X15 w/ exhale On Ebows x15 w/ exhale  On elbows x15 w/ exhale X15 w/ exhale    Standing Lumbar Extension w/ counter support  2x10  2x10 2x10  2x10 2x10    Hamstring Stretch  10\" Hold x5 8\" step  10\" Hold x5 8\" step 10\" Hold x5 8\" step  10\" Holdx5 8\" step 10\" Hold x5 8\" step    Piriformis Stretch    HEP      Postural Mechanics         Lumbar Extension @ Wall         Trunk Extension Machine   3,11,B1 P60 3x10  P60   3x10 P60 3x10  P60 3x10 P60 3x10    TRX Squats  2x10 NT      Hip Flexor Stretch  PT Assist 10\" Hold x5 ea PT Assist 10\" Hold  PT Assist 10\" Hold x5 ea PT Assist 10\" Hold x5 ea PT Assist 10\" Hold x5 ea   Machine Retractions " "  7,1 P22x10  P2 2x10 P2 2x10  P2 2x10 P2 2x10    Standing Hip Ext         SKC  10\" Hold x5 DKC  10\" Hold x5 10\" Holdx5 10\" Hold x5 DKC   Seated Thoracolumbar Flexion w/ PB  5\" Hold x10 ea   5\" Hold x5 ea 5\" Hold x10 ea   HEP Instruction  BM   BM  BM    Ther Activity                        Gait Training                        Modalities        MHP  8' Seated  8' Seated 8' seated 8' Seated  8' Seated    Ice 5' Post  5' post  5' post 5' Post 5' Post                "

## 2025-06-26 ENCOUNTER — OFFICE VISIT (OUTPATIENT)
Dept: PHYSICAL THERAPY | Facility: CLINIC | Age: 55
End: 2025-06-26
Attending: INTERNAL MEDICINE
Payer: OTHER MISCELLANEOUS

## 2025-06-26 DIAGNOSIS — F32.A DEPRESSION, UNSPECIFIED DEPRESSION TYPE: ICD-10-CM

## 2025-06-26 DIAGNOSIS — I10 HYPERTENSION, UNSPECIFIED TYPE: ICD-10-CM

## 2025-06-26 DIAGNOSIS — E78.2 MIXED HYPERLIPIDEMIA: ICD-10-CM

## 2025-06-26 DIAGNOSIS — M54.50 ACUTE BILATERAL LOW BACK PAIN, UNSPECIFIED WHETHER SCIATICA PRESENT: Primary | ICD-10-CM

## 2025-06-26 DIAGNOSIS — M10.9 GOUT, UNSPECIFIED CAUSE, UNSPECIFIED CHRONICITY, UNSPECIFIED SITE: ICD-10-CM

## 2025-06-26 PROCEDURE — 97110 THERAPEUTIC EXERCISES: CPT

## 2025-06-26 RX ORDER — CITALOPRAM HYDROBROMIDE 40 MG/1
40 TABLET ORAL DAILY
Qty: 90 TABLET | Refills: 1 | Status: SHIPPED | OUTPATIENT
Start: 2025-06-26

## 2025-06-26 RX ORDER — ALLOPURINOL 100 MG/1
100 TABLET ORAL DAILY
Qty: 90 TABLET | Refills: 1 | Status: SHIPPED | OUTPATIENT
Start: 2025-06-26

## 2025-06-26 RX ORDER — SIMVASTATIN 20 MG
20 TABLET ORAL DAILY
Qty: 90 TABLET | Refills: 1 | Status: SHIPPED | OUTPATIENT
Start: 2025-06-26

## 2025-06-26 RX ORDER — LISINOPRIL 5 MG/1
5 TABLET ORAL DAILY
Qty: 90 TABLET | Refills: 1 | Status: SHIPPED | OUTPATIENT
Start: 2025-06-26

## 2025-06-26 NOTE — PROGRESS NOTES
PT Discharge    Today's date: 2025  Patient name: Walter Chopra  : 1970  MRN: [id number]  Referring provider: Nela Reynoso DO  Dx:   Encounter Diagnosis     ICD-10-CM    1. Acute bilateral low back pain, unspecified whether sciatica present  M54.50                      Assessment  Impairments: pain with function and activity limitations  Symptom irritability: low    Assessment details: The patient presents to Physical Therapy today with a notable improvement in symptomatology and function in the lumbar spine.  Patient is happy with his progress to this point and feels that his condition is much more manageable. We will d/c patient today from Physical Therapy services and transition to an independent HEP to continue managing his condition. Patient was encouraged to call the office if he has any questions or concerns.   Understanding of Dx/Px/POC: excellent     Prognosis: good    Goals  STG: (6 weeks)   1.) Patient will initiate HEP Independently MET   2.) Patient will have a 50% reduction in overall symptoms MET   3.) Patient will demonstrate improved hip strength evidenced by 50% in all planes of motion MET  4.) Patient will demonstrate improved thoracolumbar mobility evidence by 50% in all planes of motion MET  5.) Patient will demonstrate improved activity tolerance to >/= 1 hour w/o symptoms MET    LTG: (12 weeks)   1.) Patient will be d/c to an HEP independently MET   2.) Patient will improve functional abilities evidenced by FOTO scores MET  3.) Eliminate pain with functional activity Partially Met  4.) Patient will demonstrate improved ability to lift, push, pull, and carry safely w/o symptoms  Partially Met   5.) Patient will demonstrate improved thoracolumbar mobility evidence by 90% in all planes of motion as evidence of restored function MET  6.) Patient will demonstrate improved hip strength evidenced by 90% in all planes of motion as evidence of restored function  MET    Plan    Duration in weeks: 8  Plan of Care beginning date: 5/20/2025  Plan of Care expiration date: 7/15/2025  Treatment plan discussed with: patient  Plan details: Plan of care was reviewed and discussed thoroughly with the patient on their current condition. Patient was instructed on a HEP with written instructions.         Subjective Evaluation    History of Present Illness  Mechanism of injury: The patient reports today with L sided low back pain that started on December 17th after sustaining a work related injury. He notes filling water jugs in his work office and reports when he lifted the water jug he strained his low back. At the time of injury patient presented to the ED and was given pain medication. He had X-ray imaging that showed mild degenerative changes of the lumbar spine evidence by disc space narrowing. He denies numbness or tingling. He states receiving care from his Chiropractor until April 16th with mild relief of symptoms. He followed up with his PCP. Pain is located in L low back region and is aggravated by activity such as rising from a chair, bending, pushing, pulling, and lifting. He is now referred to OPPT.    Update 6-19-25: The patient reports today with a 70-80% improvement in low back function and symptoms since the start of Physical Therapy treatment. He reports still having difficulty with activity such as bending, lifting, carrying, pushing, and pulling. He is happy with his continued progress.    Update 6-26:25: The patient reports today with an overall improvement in low back function and symptoms. He feels that his condition is much more manageable and would like to transition to an independent home exercise program to continue managing his condition.           Not a recurrent problem   Quality of life: good    Patient Goals  Patient goals for therapy: decreased pain, improved balance, increased motion, increased strength, independence with ADLs/IADLs and return to  sport/leisure activities    Pain  Current pain rating: 3  At best pain rating: 3  At worst pain ratin  Location: L low back  Quality: sharp  Relieving factors: ice, rest and relaxation  Aggravating factors: lifting (heavy activity)  Progression: improved    Social Support    Employment status: working (Desk work)    Diagnostic Tests  X-ray: abnormal  Treatments  Current treatment: physical therapy      Objective     Concurrent Complaints  Negative for night pain, disturbed sleep, bladder dysfunction, bowel dysfunction and saddle (S4) numbness    Postural Observations  Seated posture: fair  Standing posture: fair    Additional Postural Observation Details  Patient demonstrates improvement in postural mechanics.    Palpation   Left   Tenderness of the erector spinae, lumbar paraspinals and quadratus lumborum.     Additional Palpation Details  L sided L4-L5, L5-S1 region    Tenderness     Lumbar Spine  No tenderness in the spinous process, left transverse process and right transverse process.     Left Hip   Tenderness in the PSIS. No tenderness in the ASIS.     Right Hip   No tenderness in the ASIS and PSIS.     Neurological Testing     Sensation     Lumbar   Left   Intact: light touch    Right   Intact: light touch    Reflexes   Left   Patellar (L4): normal (2+)  Achilles (S1): normal (2+)    Right   Patellar (L4): normal (2+)  Achilles (S1): normal (2+)    Active Range of Motion     Lumbar   Flexion:  WFL  Extension:  WFL  Left lateral flexion:  WFL  Right lateral flexion:  WFL  Left rotation:  WFL  Right rotation:  WFL    Additional Active Range of Motion Details  Patient demonstrates improvement in all planes of mobility.    Strength/Myotome Testing     Left Hip   Planes of Motion   Flexion: WFL  Extension: WFL  Abduction: WFL  Adduction: WFL  External rotation: WFL  Internal rotation: WFL    Right Hip   Planes of Motion   Flexion: WFL  Extension: WFL  Abduction: WFL  Adduction: WFL  External rotation:  "WFL  Internal rotation: WFL    Left Knee   Flexion: 5  Extension: 5    Right Knee   Flexion: 5  Extension: 5    Left Ankle/Foot   Dorsiflexion: 5  Plantar flexion: 5  Inversion: 5  Eversion: 5    Right Ankle/Foot   Dorsiflexion: 5  Plantar flexion: 5  Inversion: 5  Eversion: 5    Muscle Activation     Additional Muscle Activation Details  Patient demonstrates improvement with transverse abdominal activation.    Tests     Lumbar     Left   Negative crossed SLR and passive SLR.     Right   Negative crossed SLR and passive SLR.     Left Pelvic Girdle/Sacrum   Negative: active SLR test.     Right Pelvic Girdle/Sacrum   Negative: active SLR test.     Left Hip   Negative RYNA, FADIR and long sit.     Right Hip   Negative RYAN, FADIR and long sit.     General Comments:      Lumbar Comments  Patient was educated on injury management regarding ice/heat application, postural mechanics, body mechanics, pain, pathoanatomic's of injury, activity limitations, positioning, and PREP to manage symptoms.             Precautions: *Avoid Aggravating Activity and Factors at this time*    Manuals 6/24 6/26 6/11 6/17 6/19   LS PA Mobs         LS STM PS/QL        LAD L  BM  BM  BM  BM BM            Neuro Re-Ed        TA Draw-in         TA Draw-in w/ CC MACRINA P4 2x10  P4 2x10   P4 2x10  P4 2x10 P4 2x10    TA Draw-in w/ Anti-Rot                                        Ther Ex        PPU X15 w/ exhale  X15 w/ exhale On Ebows x15 w/ exhale  On elbows x15 w/ exhale X15 w/ exhale    Standing Lumbar Extension w/ counter support  2x10  2x10 2x10  2x10 2x10    Hamstring Stretch  10\" Hold x5 8\" step  10\" Hold x5 8\" step 10\" Hold x5 8\" step  10\" Holdx5 8\" step 10\" Hold x5 8\" step    Piriformis Stretch    HEP      Postural Mechanics         Lumbar Extension @ Wall         Trunk Extension Machine   3,11,B1 P60 3x10  P60   3x10 P60 3x10  P60 3x10 P60 3x10    TRX Squats  2x10 NT      Hip Flexor Stretch  PT Assist 10\" Hold x5 ea PT Assist 10\" Hold  PT " "Assist 10\" Hold x5 ea PT Assist 10\" Hold x5 ea PT Assist 10\" Hold x5 ea   Machine Retractions   7,1 P22x10  P2 2x10 P2 2x10  P2 2x10 P2 2x10    Standing Hip Ext         SKC  10\" Hold x5 DKC 10\" Hold x5 DKC 10\" Hold x5 10\" Holdx5 10\" Hold x5 DKC   Seated Thoracolumbar Flexion w/ PB  5\" Hold x10 ea 5\" Hold x10 ea  5\" Hold x5 ea 5\" Hold x10 ea   HEP Instruction  BM   BM  BM    Ther Activity                        Gait Training                        Modalities        MHP  8' Seated  8'  Seated 8' seated 8' Seated  8' Seated    Ice 5' Post  5' post  5' post 5' Post 5' Post           "

## 2025-06-27 DIAGNOSIS — J01.00 ACUTE NON-RECURRENT MAXILLARY SINUSITIS: ICD-10-CM

## 2025-06-27 RX ORDER — TRAZODONE HYDROCHLORIDE 50 MG/1
50 TABLET ORAL
Qty: 30 TABLET | Refills: 5 | Status: SHIPPED | OUTPATIENT
Start: 2025-06-27

## 2025-07-10 ENCOUNTER — OFFICE VISIT (OUTPATIENT)
Dept: FAMILY MEDICINE CLINIC | Facility: CLINIC | Age: 55
End: 2025-07-10
Payer: COMMERCIAL

## 2025-07-10 VITALS
BODY MASS INDEX: 36.96 KG/M2 | RESPIRATION RATE: 18 BRPM | DIASTOLIC BLOOD PRESSURE: 78 MMHG | TEMPERATURE: 98 F | WEIGHT: 264 LBS | HEART RATE: 86 BPM | OXYGEN SATURATION: 97 % | SYSTOLIC BLOOD PRESSURE: 124 MMHG | HEIGHT: 71 IN

## 2025-07-10 DIAGNOSIS — Z00.00 ANNUAL PHYSICAL EXAM: Primary | ICD-10-CM

## 2025-07-10 DIAGNOSIS — Z12.11 SCREEN FOR COLON CANCER: ICD-10-CM

## 2025-07-10 PROCEDURE — 99396 PREV VISIT EST AGE 40-64: CPT | Performed by: INTERNAL MEDICINE

## 2025-07-10 NOTE — ASSESSMENT & PLAN NOTE
Lo fat diet and exercise - continue PT exercises  Repeat cologard - encouraged consider colonoscopy No bowel issues reported  Stay hydrated

## 2025-07-10 NOTE — PROGRESS NOTES
Adult Annual Physical  Name: Walter Chopra      : 1970      MRN: [id number]  Encounter Provider: Nela Reynoso DO  Encounter Date: 7/10/2025   Encounter department: [facility]    :  Assessment & Plan  Annual physical exam  Lo fat diet and exercise - continue PT exercises  Repeat cologard - encouraged consider colonoscopy No bowel issues reported  Stay hydrated        Screen for colon cancer    Orders:    Cologuard        Preventive Screenings:  - Diabetes Screening: screening up-to-date  - Cholesterol Screening: screening not indicated and has hyperlipidemia   - Colon cancer screening: orders placed   - Lung cancer screening: screening not indicated   - Prostate cancer screening: screening up-to-date     Immunizations:  - Immunizations due: Prevnar 20, Tdap and Zoster (Shingrix)      Depression Screening and Follow-up Plan: Patient was screened for depression during today's encounter. They screened negative with a PHQ-2 score of 0.          History of Present Illness   Pt just completed PT for back pain and feels better The other day he strained quad getting into care Has some pain back of left thigh Otherwise has been doing well overall He gets up to go to the bathroom at least once but was more That decreased when he stopped drinking fluids in evening as much     Adult Annual Physical:  Patient presents for annual physical.     Diet and Physical Activity:  - Diet/Nutrition: frequent junk food.  - Exercise: walking.    Depression Screening:  - PHQ-2 Score: 0    General Health:  - Sleep: 7-8 hours of sleep on average.  - Hearing: normal hearing left ear and decreased hearing right ear.  - Vision:. Use readers for up close  - Dental: regular dental visits, brushes teeth once daily and does not floss.    /GYN Health:  - Follows with GYN: no.   - History of STDs: no     Health:  - History of STDs: no.   - Urinary symptoms: nocturia and weak urinary stream.     Advanced Care Planning:  -  Has an advanced directive?: yes    - Has a durable medical POA?: yes    - ACP document given to patient?: no      Review of Systems   Constitutional:  Negative for chills and fever.   HENT: Negative.     Eyes:  Negative for visual disturbance.   Respiratory:  Negative for cough and shortness of breath.    Cardiovascular: Negative.    Gastrointestinal: Negative.    Genitourinary: Negative.    Musculoskeletal:  Positive for arthralgias.   Neurological:  Negative for dizziness, light-headedness and headaches.   Psychiatric/Behavioral:  Negative for self-injury. The patient is not hyperactive.      Past Medical History[1]  Past Surgical History[2]  Social History     Socioeconomic History    Marital status: /Civil Union     Spouse name: Not on file    Number of children: Not on file    Years of education: Not on file    Highest education level: Not on file   Occupational History    Not on file   Tobacco Use    Smoking status: Former     Current packs/day: 0.00     Types: Cigarettes     Quit date:      Years since quittin.5    Smokeless tobacco: Never   Vaping Use    Vaping status: Never Used   Substance and Sexual Activity    Alcohol use: Not Currently     Comment: occassionally    Drug use: Not Currently    Sexual activity: Not on file   Other Topics Concern    Not on file   Social History Narrative    Not on file     Social Drivers of Health     Financial Resource Strain: Not on file   Food Insecurity: No Food Insecurity (2025)    Nursing - Inadequate Food Risk Classification     Worried About Running Out of Food in the Last Year: Never true     Ran Out of Food in the Last Year: Never true     Ran Out of Food in the Last Year: Not on file   Transportation Needs: No Transportation Needs (2025)    PRAPARE - Transportation     Lack of Transportation (Medical): No     Lack of Transportation (Non-Medical): No   Physical Activity: Not on file   Stress: Not on file   Social Connections: Not on file  "  Intimate Partner Violence: Not on file   Housing Stability: Low Risk  (7/5/2025)    Housing Stability Vital Sign     Unable to Pay for Housing in the Last Year: No     Number of Times Moved in the Last Year: 0     Homeless in the Last Year: No     No Known Allergies      Objective   /78   Pulse 86   Temp 98 °F (36.7 °C) (Temporal)   Resp 18   Ht 5' 11\" (1.803 m)   Wt 120 kg (264 lb)   SpO2 97%   BMI 36.82 kg/m²     Physical Exam  Vitals and nursing note reviewed.   Constitutional:       General: He is not in acute distress.     Appearance: Normal appearance. He is not ill-appearing, toxic-appearing or diaphoretic.   HENT:      Head: Normocephalic and atraumatic.      Right Ear: External ear normal.      Left Ear: External ear normal.      Nose: Nose normal.      Mouth/Throat:      Mouth: Mucous membranes are moist.     Eyes:      General: No scleral icterus.     Extraocular Movements: Extraocular movements intact.      Conjunctiva/sclera: Conjunctivae normal.      Pupils: Pupils are equal, round, and reactive to light.       Cardiovascular:      Rate and Rhythm: Normal rate and regular rhythm.      Pulses: Normal pulses.      Heart sounds: Normal heart sounds.   Pulmonary:      Effort: Pulmonary effort is normal. No respiratory distress.      Breath sounds: Normal breath sounds. No wheezing.   Abdominal:      General: There is no distension.      Palpations: Abdomen is soft.      Tenderness: There is no abdominal tenderness.     Musculoskeletal:      Cervical back: Normal range of motion and neck supple.      Right lower leg: No edema.      Left lower leg: No edema.   Lymphadenopathy:      Cervical: No cervical adenopathy.     Skin:     General: Skin is warm and dry.      Coloration: Skin is not jaundiced or pale.     Neurological:      General: No focal deficit present.      Mental Status: He is alert and oriented to person, place, and time. Mental status is at baseline.      Cranial Nerves: No " cranial nerve deficit.                [1]   Past Medical History:  Diagnosis Date    Annual physical exam 11/16/2020    Annual physical exam 11/16/2020    GERD (gastroesophageal reflux disease)     Hyperlipidemia     Hypertension     Major depressive disorder, single episode, moderate (HCC) 06/16/2021    PER CMS/ICD 10 CODING GUIDELINES - per provider approval   [2]   Past Surgical History:  Procedure Laterality Date    NO PAST SURGERIES      VASECTOMY

## 2025-07-13 ENCOUNTER — APPOINTMENT (OUTPATIENT)
Dept: RADIOLOGY | Facility: MEDICAL CENTER | Age: 55
End: 2025-07-13
Payer: COMMERCIAL

## 2025-07-13 ENCOUNTER — OFFICE VISIT (OUTPATIENT)
Dept: URGENT CARE | Facility: MEDICAL CENTER | Age: 55
End: 2025-07-13
Payer: COMMERCIAL

## 2025-07-13 VITALS
HEART RATE: 90 BPM | BODY MASS INDEX: 34.95 KG/M2 | SYSTOLIC BLOOD PRESSURE: 136 MMHG | OXYGEN SATURATION: 98 % | DIASTOLIC BLOOD PRESSURE: 74 MMHG | WEIGHT: 258 LBS | RESPIRATION RATE: 17 BRPM | TEMPERATURE: 98 F | HEIGHT: 72 IN

## 2025-07-13 DIAGNOSIS — M25.552 LEFT HIP PAIN: Primary | ICD-10-CM

## 2025-07-13 DIAGNOSIS — M25.552 LEFT HIP PAIN: ICD-10-CM

## 2025-07-13 PROCEDURE — 96372 THER/PROPH/DIAG INJ SC/IM: CPT

## 2025-07-13 PROCEDURE — 73502 X-RAY EXAM HIP UNI 2-3 VIEWS: CPT

## 2025-07-13 PROCEDURE — S9083 URGENT CARE CENTER GLOBAL: HCPCS

## 2025-07-13 PROCEDURE — G0383 LEV 4 HOSP TYPE B ED VISIT: HCPCS

## 2025-07-13 RX ORDER — KETOROLAC TROMETHAMINE 30 MG/ML
30 INJECTION, SOLUTION INTRAMUSCULAR; INTRAVENOUS ONCE
Status: COMPLETED | OUTPATIENT
Start: 2025-07-13 | End: 2025-07-13

## 2025-07-13 RX ADMIN — KETOROLAC TROMETHAMINE 30 MG: 30 INJECTION, SOLUTION INTRAMUSCULAR; INTRAVENOUS at 17:50

## 2025-07-13 NOTE — PROGRESS NOTES
[facility]  Name: Walter Chopra      : 1970      MRN: [id number]  Encounter Provider: BLADE Wheatley  Encounter Date: 2025   Encounter department: [facility] [place]  :  Assessment & Plan  Left hip pain    Orders:  •  ketorolac (TORADOL) injection 30 mg  •  XR hip/pelv 2-3 vws left if performed; Future    Provider Radiology Interpretation (preliminary)   Final results will be as per official Radiology Report when available:   No acute fracture.     Patient Instructions  Follow up with PCP in 3-5 days.  Proceed to  ER if symptoms worsen.    If tests are performed, our office will contact you with results only if changes need to made to the care plan discussed with you at the visit. [facility]    Chief Complaint:   Chief Complaint   Patient presents with   • Hip Pain     Started 5 days ago   Started spontaneously  left hip pain  PCP seen ice, and tylenol, and motrin     History of Present Illness   55yo male who presents ambulatory with reports of hip pain. Tuesday 630 am getting into his car, sat in and pulled the left leg toward him to gt into the car. Since that time he has had significant pain in the hip with any movement. He would be ok with standing,sitting, lack of movement. He has been doing ice/tylenol/ibuprofen and resting without improvement. He saw PCP a few days ago who felt it may be a pulled muscle. Today  he tried doing some stretching thing it may  help and has continued to cause pain and now very uncomfortable. It is also now going down the back of his leg into the back of knee. Denies any pain in his lower back currently. However, he was recently graduated from PT which he was doing for low back problems. Lumbar plain xrays done in 2024 without significant findings. He has not had any direct trauma/fall.     Patient reports today when he was trying to stretch he placed his left heel/leg over his right  thigh and then pressed into his left knee to stretch the hip. It was at that time that the pain became more excruciating.     Currently having difficulty with moving leg/sitting up from laying which is painful due to a pulling feeling in the back of his leg.       History obtained from: patient    Review of Systems   Constitutional:  Negative for chills, fatigue and fever.   Musculoskeletal:  Positive for arthralgias and gait problem. Negative for back pain and neck pain.     Past Medical History   Past Medical History[1]  Past Surgical History[2]  Family History[3]  he reports that he quit smoking about 11 years ago. His smoking use included cigarettes. He has never used smokeless tobacco. He reports that he does not currently use alcohol. He reports that he does not currently use drugs.  Current Outpatient Medications   Medication Instructions   • allopurinol (ZYLOPRIM) 100 mg, Oral, Daily   • b complex vitamins capsule 1 capsule, Daily   • buPROPion (WELLBUTRIN XL) 150 mg, Oral, Every morning   • Cholecalciferol (VITAMIN D PO) 1 tablet, Daily   • citalopram (CELEXA) 40 mg, Oral, Daily   • lisinopril (ZESTRIL) 5 mg, Oral, Daily   • omeprazole (PRILOSEC) 20 mg, Oral, Daily   • sildenafil (REVATIO) 20 mg, Oral, Daily PRN   • simvastatin (ZOCOR) 20 mg, Oral, Daily   • traZODone (DESYREL) 50 mg, Oral, Daily at bedtime   • vitamin C 100 mg, Daily   • zinc gluconate 50 mg, Daily   Allergies[4]     Objective   /74   Pulse 90   Temp 98 °F (36.7 °C)   Resp 17   Ht 6' (1.829 m)   Wt 117 kg (258 lb)   SpO2 98%   BMI 34.99 kg/m²      Physical Exam  Vitals and nursing note reviewed.   Constitutional:       General: He is not in acute distress.     Appearance: He is obese. He is not ill-appearing.   HENT:      Head: Normocephalic and atraumatic.     Cardiovascular:      Rate and Rhythm: Normal rate.      Pulses: Normal pulses.   Pulmonary:      Effort: Pulmonary effort is normal.     Musculoskeletal:      Left hip:  "Tenderness present. Decreased range of motion. Decreased strength.      Left upper leg: Tenderness present. No swelling.        Legs:       Comments: Tenderness throughout the posterior thigh.      Skin:     General: Skin is warm and dry.      Capillary Refill: Capillary refill takes less than 2 seconds.     Neurological:      General: No focal deficit present.      Mental Status: He is alert and oriented to person, place, and time. Mental status is at baseline.         Portions of the record may have been created with voice recognition software.  Occasional wrong word or \"sound a like\" substitutions may have occurred due to the inherent limitations of voice recognition software.  Read the chart carefully and recognize, using context, where substitutions have occurred.         [1]  Past Medical History:  Diagnosis Date   • Annual physical exam 11/16/2020   • Annual physical exam 11/16/2020   • GERD (gastroesophageal reflux disease)    • Hyperlipidemia    • Hypertension    • Major depressive disorder, single episode, moderate (HCC) 06/16/2021    PER CMS/ICD 10 CODING GUIDELINES - per provider approval   [2]  Past Surgical History:  Procedure Laterality Date   • NO PAST SURGERIES     • VASECTOMY     [3]  Family History  Problem Relation Name Age of Onset   • Breast cancer Mother     • Diabetes Father     • Lymphoma Father          Non-Hodgkin's lymphoma    [4]  No Known Allergies"

## 2025-07-13 NOTE — PATIENT INSTRUCTIONS
Please follow up with your primary provider in the next several days. Should you have any worsening of symptoms, or lack of improvement please be re-evaluated. If needed for significant concerns, consider 911 or ER evaluation.     You may take over the counter Tylenol (Acetaminophen) and/or Motrin (Ibuprofen) as needed, as directed on packaging.     Tylenol and ibuprofen used together have been shown to work as well as an opiate without the opiate effects. These two medications are two different medications and work in two different ways to help with pain.  An alternating dosing schedule of 3 hours for an adult may look like this   6am Ibuprofen 400mg  9am Tylenol 1000mg  12pm Ibuprofen 400mg  3pm Tylenol 1000mg  6pm Ibuprofen 400mg  9pm Tylenol 1000mg.  This dosing schedule does not exceed the recommended maximum dose of 3000 mg/day for acetaminophen and 1200mg/day for over-the-counter ibuprofen     Your xray was read by the provider you saw today in the office as a preliminary result. Your xray will be reviewed and an official reading will be provided by a Radiologist. If you have access to My Chart you can see these results there. Also if there is any significant findings you will be contacted with those results.

## 2025-07-14 ENCOUNTER — TELEPHONE (OUTPATIENT)
Age: 55
End: 2025-07-14

## 2025-07-14 ENCOUNTER — HOSPITAL ENCOUNTER (EMERGENCY)
Facility: HOSPITAL | Age: 55
Discharge: HOME | End: 2025-07-14
Attending: EMERGENCY MEDICINE | Admitting: EMERGENCY MEDICINE
Payer: COMMERCIAL

## 2025-07-14 VITALS
OXYGEN SATURATION: 98 % | SYSTOLIC BLOOD PRESSURE: 138 MMHG | HEART RATE: 84 BPM | TEMPERATURE: 97.8 F | RESPIRATION RATE: 20 BRPM | DIASTOLIC BLOOD PRESSURE: 90 MMHG

## 2025-07-14 DIAGNOSIS — M25.552 LEFT HIP PAIN: Primary | ICD-10-CM

## 2025-07-14 PROCEDURE — 99284 EMERGENCY DEPT VISIT MOD MDM: CPT | Performed by: EMERGENCY MEDICINE

## 2025-07-14 PROCEDURE — 99283 EMERGENCY DEPT VISIT LOW MDM: CPT

## 2025-07-14 RX ORDER — OXYCODONE HYDROCHLORIDE 5 MG/1
5 TABLET ORAL ONCE
Refills: 0 | Status: COMPLETED | OUTPATIENT
Start: 2025-07-14 | End: 2025-07-14

## 2025-07-14 RX ORDER — OXYCODONE HYDROCHLORIDE 5 MG/1
5 TABLET ORAL EVERY 4 HOURS PRN
Qty: 12 TABLET | Refills: 0 | Status: SHIPPED | OUTPATIENT
Start: 2025-07-14 | End: 2025-07-24

## 2025-07-14 RX ADMIN — OXYCODONE HYDROCHLORIDE 5 MG: 5 TABLET ORAL at 21:43

## 2025-07-14 NOTE — TELEPHONE ENCOUNTER
Xray suggests mild arthritis but description that caused the pain sounded more muscular - would consider eval at PT for new sxs

## 2025-07-14 NOTE — TELEPHONE ENCOUNTER
Pt called, has been doing the recommendations per pcp for his hip pain but it has gotten worse. Went to UC yesterday, was given a shot of Toradol, asking if pcp can look at those notes as well. Not sure what else to do about the pain at this point. Asking pcp what to do at this point. Please advise.

## 2025-07-15 DIAGNOSIS — M25.552 LEFT HIP PAIN: Primary | ICD-10-CM

## 2025-07-15 DIAGNOSIS — M54.40 ACUTE LEFT-SIDED LOW BACK PAIN WITH SCIATICA, SCIATICA LATERALITY UNSPECIFIED: ICD-10-CM

## 2025-07-15 NOTE — ED PROVIDER NOTES
Time reflects when diagnosis was documented in both MDM as applicable and the Disposition within this note       Time User Action Codes Description Comment    7/14/2025  9:38 PM ElsielElen Junior Add [M25.552] Left hip pain           ED Disposition       ED Disposition   Discharge    Condition   Stable    Date/Time   Mon Jul 14, 2025  9:38 PM    Comment   Walter Chopra discharge to home/self care.                   Assessment & Plan       Medical Decision Making  Risk  Prescription drug management.        54-year-old male presenting for evaluation of left hip pain for one week.  Patient has pain and tenderness in the posterior part of the left thigh, pain is worse with straightening of the leg as well as movement of the hip.  I suspect he may have hamstring muscle strain versus possible sciatic nerve irritation.  I did review his x-rays that he had done which did not show any acute fracture.  Will treat symptomatically.  No indication for additional imaging at this time.  He has no obvious deformity or swelling to the back of the leg.  Will place referral for orthopedics for follow-up.  Did also discussed with patient that he should follow-up with physical therapy furl was placed earlier.  Will have patient continue Tylenol and Motrin for mild to moderate pain, will prescribe a few doses of oxycodone to be taken as needed for severe pain.  Discussed with patient strict return precautions.  Patient expressed understanding was agreeable for discharge.         Medications   oxyCODONE (ROXICODONE) IR tablet 5 mg (5 mg Oral Given 7/14/25 2143)       ED Risk Strat Scores                    No data recorded        SBIRT 22yo+      Flowsheet Row Most Recent Value   Initial Alcohol Screen: US AUDIT-C     1. How often do you have a drink containing alcohol? 0 Filed at: 07/14/2025 2121   2. How many drinks containing alcohol do you have on a typical day you are drinking?  0 Filed at: 07/14/2025 2121   3a. Male UNDER 65: How  often do you have five or more drinks on one occasion? 0 Filed at: 07/14/2025 2121   3b. FEMALE Any Age, or MALE 65+: How often do you have 4 or more drinks on one occassion? 0 Filed at: 07/14/2025 2121   Audit-C Score 0 Filed at: 07/14/2025 2121   VERO: How many times in the past year have you...    Used an illegal drug or used a prescription medication for non-medical reasons? Never Filed at: 07/14/2025 2121                            History of Present Illness       Chief Complaint   Patient presents with    Hip Pain     Pt reports last Tuesday he was stepping to get in his car and he felt a pop in his left hip. Since then he has been having pain. Worse with movement. Radiating down left leg.        Past Medical History[1]   Past Surgical History[2]   Family History[3]   Social History[4]   E-Cigarette/Vaping    E-Cigarette Use Never User       E-Cigarette/Vaping Substances    Nicotine No     THC No     CBD No     Flavoring No     Other No     Unknown No       I have reviewed and agree with the history as documented.     HPI    54-year-old male presenting for evaluation of left hip pain.  Patient states he was getting into his car 6 days ago and felt something pop in the his left hip.  He was seen at urgent care and had x-rays which showed mild osteoarthritis but no acute fracture.  States pain has been getting worse so he presented to the emergency department.  He did try some stretching which seemed to make the pain worse.  He denies any pain in the lower back.  He states the pain does go down the back of the leg to the back of the knee.  He states pain is worse when he straightens his leg or is moving.  He has been trying Tylenol and Motrin without any improvement in symptoms.  Denies any numbness or weakness in the legs.  Denies abdominal pain.  Denies fevers or chills.    Review of Systems   Constitutional:  Negative for appetite change, chills and fever.   HENT:  Negative for congestion, rhinorrhea and sore  throat.    Respiratory:  Negative for cough and shortness of breath.    Cardiovascular:  Negative for chest pain.   Gastrointestinal:  Negative for abdominal pain, diarrhea, nausea and vomiting.   Musculoskeletal:  Positive for arthralgias. Negative for back pain and myalgias.   Skin:  Negative for rash.   Neurological:  Negative for dizziness, weakness, light-headedness, numbness and headaches.   All other systems reviewed and are negative.          Objective       ED Triage Vitals [07/14/25 2122]   Temperature Pulse Blood Pressure Respirations SpO2 Patient Position - Orthostatic VS   97.8 °F (36.6 °C) 84 138/90 20 98 % Sitting      Temp Source Heart Rate Source BP Location FiO2 (%) Pain Score    Temporal Monitor Left arm -- 9      Vitals      Date and Time Temp Pulse SpO2 Resp BP Pain Score FACES Pain Rating User   07/14/25 2143 -- -- -- -- -- 6 -- TB   07/14/25 2127 -- -- -- -- -- 9 --    07/14/25 2122 97.8 °F (36.6 °C) 84 98 % 20 138/90 9 -- CD            Physical Exam  Vitals and nursing note reviewed.   Constitutional:       General: He is not in acute distress.     Appearance: Normal appearance. He is well-developed and normal weight. He is not ill-appearing, toxic-appearing or diaphoretic.   HENT:      Head: Normocephalic and atraumatic.      Right Ear: External ear normal.      Left Ear: External ear normal.      Nose: Nose normal.      Mouth/Throat:      Mouth: Mucous membranes are moist.      Pharynx: Oropharynx is clear.     Eyes:      Extraocular Movements: Extraocular movements intact.      Conjunctiva/sclera: Conjunctivae normal.       Cardiovascular:      Rate and Rhythm: Normal rate and regular rhythm.      Pulses: Normal pulses.      Heart sounds: Normal heart sounds. No murmur heard.     No friction rub. No gallop.   Pulmonary:      Effort: Pulmonary effort is normal. No respiratory distress.      Breath sounds: Normal breath sounds. No wheezing or rales.   Abdominal:      General: There is no  distension.      Palpations: Abdomen is soft.      Tenderness: There is no abdominal tenderness. There is no guarding or rebound.     Musculoskeletal:         General: No tenderness.      Cervical back: Neck supple.      Right lower leg: No edema.      Left lower leg: No edema.      Comments: Pain in the back of the left thigh with flexion of the hip as well as internal and external rotation of the hip.  Mild tenderness over the posterior left thigh.  No obvious deformity or swelling.     Skin:     General: Skin is warm and dry.      Coloration: Skin is not pale.      Findings: No rash.     Neurological:      General: No focal deficit present.      Mental Status: He is alert and oriented to person, place, and time.      Cranial Nerves: No cranial nerve deficit.      Sensory: No sensory deficit.      Motor: No weakness.     Psychiatric:         Mood and Affect: Mood normal.         Behavior: Behavior normal.         Results Reviewed       None            No orders to display       Procedures    ED Medication and Procedure Management   Prior to Admission Medications   Prescriptions Last Dose Informant Patient Reported? Taking?   Ascorbic Acid (vitamin C) 100 MG tablet   Yes No   Sig: Take 100 mg by mouth in the morning.   Cholecalciferol (VITAMIN D PO)  Self Yes No   Sig: Take 1 tablet by mouth in the morning.   allopurinol (ZYLOPRIM) 100 mg tablet   No No   Sig: TAKE 1 TABLET DAILY   b complex vitamins capsule  Self Yes No   Sig: Take 1 capsule by mouth in the morning.   buPROPion (WELLBUTRIN XL) 150 mg 24 hr tablet   No No   Sig: Take 1 tablet (150 mg total) by mouth every morning   citalopram (CeleXA) 40 mg tablet   No No   Sig: TAKE 1 TABLET DAILY   lisinopril (ZESTRIL) 5 mg tablet   No No   Sig: TAKE 1 TABLET DAILY   omeprazole (PriLOSEC) 20 mg delayed release capsule   No No   Sig: TAKE 1 CAPSULE DAILY   sildenafil (REVATIO) 20 mg tablet   No No   Sig: Take 1 tablet (20 mg total) by mouth daily as needed (ED  symptoms)   simvastatin (ZOCOR) 20 mg tablet   No No   Sig: TAKE 1 TABLET DAILY   traZODone (DESYREL) 50 mg tablet   No No   Sig: Take 1 tablet (50 mg total) by mouth daily at bedtime   zinc gluconate 50 mg tablet  Self Yes No   Sig: Take 50 mg by mouth in the morning.      Facility-Administered Medications: None     Discharge Medication List as of 7/14/2025  9:40 PM        START taking these medications    Details   oxyCODONE (Roxicodone) 5 immediate release tablet Take 1 tablet (5 mg total) by mouth every 4 (four) hours as needed for severe pain for up to 10 days Max Daily Amount: 30 mg, Starting Mon 7/14/2025, Until Thu 7/24/2025 at 2359, Normal           CONTINUE these medications which have NOT CHANGED    Details   allopurinol (ZYLOPRIM) 100 mg tablet TAKE 1 TABLET DAILY, Starting Thu 6/26/2025, Normal      Ascorbic Acid (vitamin C) 100 MG tablet Take 100 mg by mouth in the morning., Historical Med      b complex vitamins capsule Take 1 capsule by mouth in the morning., Historical Med      buPROPion (WELLBUTRIN XL) 150 mg 24 hr tablet Take 1 tablet (150 mg total) by mouth every morning, Starting Tue 3/25/2025, Until Sun 9/21/2025, Normal      Cholecalciferol (VITAMIN D PO) Take 1 tablet by mouth in the morning., Historical Med      citalopram (CeleXA) 40 mg tablet TAKE 1 TABLET DAILY, Starting Thu 6/26/2025, Normal      lisinopril (ZESTRIL) 5 mg tablet TAKE 1 TABLET DAILY, Starting Thu 6/26/2025, Normal      omeprazole (PriLOSEC) 20 mg delayed release capsule TAKE 1 CAPSULE DAILY, Starting Mon 5/19/2025, Normal      sildenafil (REVATIO) 20 mg tablet Take 1 tablet (20 mg total) by mouth daily as needed (ED symptoms), Starting Tue 4/15/2025, Normal      simvastatin (ZOCOR) 20 mg tablet TAKE 1 TABLET DAILY, Starting Thu 6/26/2025, Normal      traZODone (DESYREL) 50 mg tablet Take 1 tablet (50 mg total) by mouth daily at bedtime, Starting Fri 6/27/2025, Normal      zinc gluconate 50 mg tablet Take 50 mg by mouth in  the morning., Historical Med             ED SEPSIS DOCUMENTATION   Time reflects when diagnosis was documented in both MDM as applicable and the Disposition within this note       Time User Action Codes Description Comment    2025  9:38 PM Ellen Segal Add [M25.552] Left hip pain                      [1]   Past Medical History:  Diagnosis Date    Annual physical exam 2020    Annual physical exam 2020    GERD (gastroesophageal reflux disease)     Hyperlipidemia     Hypertension     Major depressive disorder, single episode, moderate (HCC) 2021    PER CMS/ICD 10 CODING GUIDELINES - per provider approval   [2]   Past Surgical History:  Procedure Laterality Date    NO PAST SURGERIES      VASECTOMY     [3]   Family History  Problem Relation Name Age of Onset    Breast cancer Mother      Diabetes Father      Lymphoma Father          Non-Hodgkin's lymphoma    [4]   Social History  Tobacco Use    Smoking status: Former     Current packs/day: 0.00     Types: Cigarettes     Quit date:      Years since quittin.5    Smokeless tobacco: Never   Vaping Use    Vaping status: Never Used   Substance Use Topics    Alcohol use: Not Currently     Comment: occassionally    Drug use: Not Currently        Ellen Segal MD  [phone]

## 2025-07-15 NOTE — DISCHARGE INSTRUCTIONS
Please continue tylenol and motrin every 6 hours as needed for mild to moderate pain, you may take oxycodone 5 mg every 4 hours as needed for severe pain.     Please follow up with physical therapy and orthopedics .

## 2025-07-15 NOTE — ED NOTES
Pt verbalized understanding of discharge instructions; verbalized understanding of picking up prescriptions which were electronically sent to pharmacy of choice; taken out of ED via wheelchair to spouse who was waiting at front of hospital; uneventful ED visit     Claribel Jones RN  07/14/25 1462

## 2025-07-16 ENCOUNTER — TELEPHONE (OUTPATIENT)
Dept: OBGYN CLINIC | Facility: CLINIC | Age: 55
End: 2025-07-16

## 2025-07-16 ENCOUNTER — OFFICE VISIT (OUTPATIENT)
Dept: OBGYN CLINIC | Facility: CLINIC | Age: 55
End: 2025-07-16
Payer: COMMERCIAL

## 2025-07-16 VITALS — BODY MASS INDEX: 34.95 KG/M2 | WEIGHT: 258 LBS | HEIGHT: 72 IN

## 2025-07-16 DIAGNOSIS — M54.32 SCIATICA OF LEFT SIDE: Primary | ICD-10-CM

## 2025-07-16 PROCEDURE — 99204 OFFICE O/P NEW MOD 45 MIN: CPT | Performed by: STUDENT IN AN ORGANIZED HEALTH CARE EDUCATION/TRAINING PROGRAM

## 2025-07-16 RX ORDER — METHYLPREDNISOLONE 4 MG/1
TABLET ORAL
Qty: 1 EACH | Refills: 0 | Status: CANCELLED | OUTPATIENT
Start: 2025-07-16

## 2025-07-16 RX ORDER — METHYLPREDNISOLONE 4 MG/1
TABLET ORAL
Qty: 1 EACH | Refills: 0 | Status: SHIPPED | OUTPATIENT
Start: 2025-07-16

## 2025-07-16 NOTE — LETTER
July 18, 2025     Patient: Walter Chopra  YOB: 1970        To Whom it May Concern:    Walter Chopra is cleared to return to work full duty without restrictions on 7/18/25.     If you have any questions or concerns, please don't hesitate to call.         Sincerely,          Jean Carlos Bliss MD        CC: No Recipients

## 2025-07-16 NOTE — TELEPHONE ENCOUNTER
I called pt lvm to inform him that he was scheduled with the incorrect provider for the left hip. I did state that Dr. Alvarado is a sports medicine surgeon and does not see hip pain. He will need to see Dr. Whitmore who is the appropriate physician. I did schedule him with Dr. Whitmore on 7/22/25 at 3 pm. To call us if ok with the appt date and time

## 2025-07-16 NOTE — PROGRESS NOTES
Ortho Sports Medicine Clinic Visit       Assessment & Plan  Sciatica of left side  Walter upon examination and review of his urgent care note, ED note and image studies does demonstrate an acute onset of sciatica.  He does demonstrate point tenderness with radiation of pain into the posterior aspect of the leg with palpation at the sacral notch.  X-rays of the hip demonstrate minimal arthritic change.  He does have some multilevel degenerative change of his lumbar spine.  I would like to treat him nonoperatively with a course of a Medrol Dosepak with the hopes of alleviating inflammation thus alleviating pressure on the nerve to potentially improve his symptoms.  Additionally, he was provided with a referral to pain management urgently to be seen given his significant dysfunction and inability to weight-bear or stand upright.  He may continue use of the Tylenol 1000 mg every 8 hours and Motrin to help manage his pain in conjunction with the use of the Medrol Dosepak.  He and his wife verbalized understanding and had no further questions.  I will see him back on an as needed basis.    Orders:    Ambulatory referral to Spine & Pain Management; Future    methylPREDNISolone 4 MG tablet therapy pack; Use as directed on package          Chief Complaint   Patient presents with    Left Hip - Pain       History of Present Illness:  The patient is a 54 y.o. male seen in clinic for left hip and buttock pain with radiation into the hamstring and calf.  He states that this initial onset was approximately 1 week ago while getting in and out of his car.  He states that he is experiencing sharp burning pain into the buttock that radiates into the hamstring and lower leg.  He denies any andrew trauma.  He does have a history of a Worker's Comp. injury of his lumbar spine and did participate in physical therapy and states that this was successful in treating his pain.  He currently denies any back pain.  He states intermittently pain  can radiate circumferentially around the hip into the anterior aspect.  He denies weakness or paresthesias into the left lower extremity.  He did see the urgent care on 2025.  He was provided a Toradol injection at that time.  He has utilized Tylenol and Motrin as well without symptomatic relief he then seek treatments at the emergency department he was prescribed oxycodone.  He does not notice significant symptomatic relief with this medication.  He has significant difficulty standing upright or ambulating.  He does present to today's visit utilizing crutches with difficulty.        Hip Surgical History:  None    Past Medical, Social and Family History:  Past Medical History[1]  Past Surgical History[2]  Allergies[3]  Medications Ordered Prior to Encounter[4]  Social History     Socioeconomic History    Marital status: /Civil Union     Spouse name: Not on file    Number of children: Not on file    Years of education: Not on file    Highest education level: Not on file   Occupational History    Not on file   Tobacco Use    Smoking status: Former     Current packs/day: 0.00     Types: Cigarettes     Quit date:      Years since quittin.5    Smokeless tobacco: Never   Vaping Use    Vaping status: Never Used   Substance and Sexual Activity    Alcohol use: Not Currently     Comment: occassionally    Drug use: Not Currently    Sexual activity: Not on file   Other Topics Concern    Not on file   Social History Narrative    Not on file     Social Drivers of Health     Financial Resource Strain: Not on file   Food Insecurity: No Food Insecurity (2025)    Nursing - Inadequate Food Risk Classification     Worried About Running Out of Food in the Last Year: Never true     Ran Out of Food in the Last Year: Never true     Ran Out of Food in the Last Year: Not on file   Transportation Needs: No Transportation Needs (2025)    PRAPARE - Transportation     Lack of Transportation (Medical): No     Lack of  Transportation (Non-Medical): No   Physical Activity: Not on file   Stress: Not on file   Social Connections: Not on file   Intimate Partner Violence: Not on file   Housing Stability: Low Risk  (7/5/2025)    Housing Stability Vital Sign     Unable to Pay for Housing in the Last Year: No     Number of Times Moved in the Last Year: 0     Homeless in the Last Year: No         I have reviewed the past medical, surgical, social and family history, medications and allergies as documented in the EMR.      Physical Exam:    Focused Left hip Exam:  - Skin: intact  - Dislocation/deformity: no  - ROM: full, painless  - TTP greater trochanter: no  - TTP SI joint: no  - TTP sacral notch: Yes with radiation pain down the leg  - Jonny test: negative  - Minnie's test: negative  - Abduction: 5/5, painless    Back:    No TTP over lumbar spinous processes, paraspinal musculature  Negative SLR     No calf tenderness to palpation     LE NV Exam:   +2 DP/PT pulses bilaterally  Foot warm, well perfused  Sensation intact to light touch L2-S1 bilaterally, SPN/DPN/tibial/saphenous/sural nerve distributions  Motor intact in SPN/DPN/TA nerve distributions      Hip Imaging:    X-rays of the hip and pelvis were obtained on 7/13/2025 and reviewed with the patient. Based on my independent evaluation, the imaging shows no acute fracture.  Mild arthritis of the hip joint observed.  No obvious lytic or blastic lesions..        Procedures:  None      This note was written with the use of dictation software. Please excuse any errors.      Scribe Attestation      I,:  Conner Pond am acting as a scribe while in the presence of the attending physician.:       I,:  Jean Carlos Bliss MD personally performed the services described in this documentation    as scribed in my presence.:                   [1]   Past Medical History:  Diagnosis Date    Annual physical exam 11/16/2020    Annual physical exam 11/16/2020    GERD (gastroesophageal reflux disease)      Hyperlipidemia     Hypertension     Major depressive disorder, single episode, moderate (HCC) 06/16/2021    PER CMS/ICD 10 CODING GUIDELINES - per provider approval   [2]   Past Surgical History:  Procedure Laterality Date    NO PAST SURGERIES      VASECTOMY     [3] No Known Allergies  [4]   Current Outpatient Medications on File Prior to Visit   Medication Sig Dispense Refill    allopurinol (ZYLOPRIM) 100 mg tablet TAKE 1 TABLET DAILY 90 tablet 1    Ascorbic Acid (vitamin C) 100 MG tablet Take 100 mg by mouth in the morning.      b complex vitamins capsule Take 1 capsule by mouth in the morning.      buPROPion (WELLBUTRIN XL) 150 mg 24 hr tablet Take 1 tablet (150 mg total) by mouth every morning 90 tablet 1    Cholecalciferol (VITAMIN D PO) Take 1 tablet by mouth in the morning.      citalopram (CeleXA) 40 mg tablet TAKE 1 TABLET DAILY 90 tablet 1    lisinopril (ZESTRIL) 5 mg tablet TAKE 1 TABLET DAILY 90 tablet 1    omeprazole (PriLOSEC) 20 mg delayed release capsule TAKE 1 CAPSULE DAILY 90 capsule 1    oxyCODONE (Roxicodone) 5 immediate release tablet Take 1 tablet (5 mg total) by mouth every 4 (four) hours as needed for severe pain for up to 10 days Max Daily Amount: 30 mg 12 tablet 0    sildenafil (REVATIO) 20 mg tablet Take 1 tablet (20 mg total) by mouth daily as needed (ED symptoms) 90 tablet 0    simvastatin (ZOCOR) 20 mg tablet TAKE 1 TABLET DAILY 90 tablet 1    traZODone (DESYREL) 50 mg tablet Take 1 tablet (50 mg total) by mouth daily at bedtime 30 tablet 5    zinc gluconate 50 mg tablet Take 50 mg by mouth in the morning.       No current facility-administered medications on file prior to visit.

## 2025-07-16 NOTE — LETTER
July 16, 2025     Patient: Walter Chopra  YOB: 1970  Date of Visit: 7/16/2025      To Whom it May Concern:    Walter Chopra is under my professional care. Walter was seen in my office on 7/16/2025. Walter is out of work over the next 2 weeks.    If you have any questions or concerns, please don't hesitate to call.         Sincerely,          Jean Carlos Bliss MD        CC: No Recipients

## 2025-07-17 ENCOUNTER — TELEPHONE (OUTPATIENT)
Dept: CARDIOLOGY CLINIC | Facility: CLINIC | Age: 55
End: 2025-07-17

## 2025-07-17 NOTE — TELEPHONE ENCOUNTER
Appointment canceled for Walter Chopra ([id number])  Visit type: [facility] OFFICE VISIT SHORT [facility]  9/25/2025 3:40 PM (20 minutes) with Driss Cuadra DO in [facility]     Reason for cancellation: Feeling Better

## 2025-07-18 ENCOUNTER — TELEPHONE (OUTPATIENT)
Dept: OBGYN CLINIC | Facility: HOSPITAL | Age: 55
End: 2025-07-18

## 2025-07-18 NOTE — TELEPHONE ENCOUNTER
Caller: Patient    Doctor: Dr. Wade    Reason for call: Patient is feeling much better and has gone back to work today He would like a note stating that he can return to full duty today with no restrictions. You can put it in his my chart. Thank you.    Call back#: [phone]

## 2025-07-22 ENCOUNTER — CONSULT (OUTPATIENT)
Dept: PAIN MEDICINE | Facility: CLINIC | Age: 55
End: 2025-07-22
Payer: COMMERCIAL

## 2025-07-22 VITALS — WEIGHT: 256 LBS | HEIGHT: 72 IN | BODY MASS INDEX: 34.67 KG/M2

## 2025-07-22 DIAGNOSIS — M79.2 NEUROPATHIC PAIN: ICD-10-CM

## 2025-07-22 DIAGNOSIS — M54.50 ACUTE ON CHRONIC LOW BACK PAIN: Primary | ICD-10-CM

## 2025-07-22 DIAGNOSIS — M54.42 ACUTE LEFT-SIDED LOW BACK PAIN WITH LEFT-SIDED SCIATICA: ICD-10-CM

## 2025-07-22 DIAGNOSIS — G89.29 ACUTE ON CHRONIC LOW BACK PAIN: Primary | ICD-10-CM

## 2025-07-22 PROCEDURE — 99244 OFF/OP CNSLTJ NEW/EST MOD 40: CPT | Performed by: STUDENT IN AN ORGANIZED HEALTH CARE EDUCATION/TRAINING PROGRAM

## 2025-07-22 RX ORDER — GABAPENTIN 300 MG/1
300 CAPSULE ORAL 3 TIMES DAILY
Qty: 90 CAPSULE | Refills: 0 | Status: SHIPPED | OUTPATIENT
Start: 2025-07-22

## 2025-07-22 NOTE — PROGRESS NOTES
"Name: Walter Chopra      : 1970      MRN: [id number]  Encounter Provider: Lucia Ramsey MD  Encounter Date: 2025   Encounter department: [facility] [place]  :    Portions of the record may have been created with voice recognition software. Occasional wrong word or \"sound a like\" substitutions may have occurred due to the inherent limitations of voice recognition software. Read the chart carefully and recognize, using context, where substitutions have occurred. Contact me with any questions.       Assessment & Plan  Acute on chronic low back pain  Neuropathic pain  Acute left-sided low back pain with left-sided sciatica    54-year-old male referred by Dr. Bliss, here for initial evaluation of acute on chronic low back pain.  Reports pain is currently predominantly in left S1 distribution with intermittent paresthesias and reports a left foot drop that impairs ambulation.  He is requiring use of crutches due to symptoms.  Denies progressive weakness, saddle anesthesia, BBI.  Recent lumbar spine x-ray shows mild DDD.  He has recently undergone physical therapy for the symptoms which had helped until a flare in symptoms 2 weeks ago without inciting event.  He is recently status post a Medrol Dosepak which did provide some benefit.      Obtain lumbar spine MRI for further evaluation of pain symptoms.    Initiate gabapentin 300 mg 3 times daily.  Risks and benefits cussed.  Dose can be tried in the future depending on response.    Continue physical therapy, home exercise program.    Follow-up after completing imaging for results review and discussion of neck steps.    Reviewed 'red flag' signs/symptoms to monitor for including weakness, numbness, BBI, urinary retention, saddle anesthesia, inability to walk, worsening pain, etc and advised to seek urgent attention if these develop.      Orders:    Ambulatory referral to Spine & Pain Management    MRI lumbar spine wo contrast; " Future    gabapentin (NEURONTIN) 300 mg capsule; Take 1 capsule (300 mg total) by mouth 3 (three) times a day        My impressions and treatment recommendations were discussed in detail with the patient who verbalized understanding and had no further questions.  Discharge instructions were provided. I personally saw and examined the patient and I agree with the above discussed plan of care.    History of Present Illness     Walter Chopra is a 54 y.o. male here for chief complaint of left-sided low back pain with radiation into the left S1 distribution of a couple weeks duration.  Patient reports he was previously experiencing bilateral axial low back pain symptoms for several months since a work injury in 2024 when he was changing a heavy water cooler.  Rates pain as 5 out of 10 currently, up to 10 out of 10 at worst.  Pain is well constantly.  Pain is described as shooting, sharp.  Notes pain limited difficulty with ambulation.  Pain is worse with lying down, standing, bending, sitting, walking, exercise, relaxation, bowel movement.  Denies significant remitting factors.      Review of Systems   Constitutional:  Negative for chills and fever.   HENT:  Negative for ear pain, sinus pressure and sore throat.    Eyes:  Negative for pain and redness.   Respiratory:  Negative for cough and wheezing.    Cardiovascular:  Negative for leg swelling.   Gastrointestinal:  Negative for abdominal pain and nausea.   Endocrine: Negative for polydipsia and polyuria.   Genitourinary:  Negative for difficulty urinating and frequency.   Musculoskeletal:  Positive for arthralgias, back pain, gait problem and myalgias.         ROM  Pain in extremity-Left leg   Skin:  Negative for pallor and wound.   Neurological:  Negative for seizures, weakness and headaches.   Psychiatric/Behavioral:  Negative for decreased concentration and sleep disturbance.      Medical History Reviewed by provider this encounter:  Tobacco   Allergies  Meds  Problems  Med Hx  Surg Hx  Fam Hx     .  Medications Ordered Prior to Encounter[1]   Social History[2]     Objective   Ht 6' (1.829 m)   Wt 116 kg (256 lb)   BMI 34.72 kg/m²      Pain Score:   5  Physical Exam  Constitutional: normal, well developed, well nourished, alert, in no distress and non-toxic and no overt pain behavior.  Eyes: anicteric  HEENT: grossly intact  Neck: supple, symmetric, trachea midline and no masses   Pulmonary: even and unlabored  Cardiovascular: No edema or pitting edema present  Skin: Normal without rashes or lesions and well hydrated  Psychiatric: Mood and affect appropriate  Neurologic: Cranial Nerves II-XII grossly intact  Musculoskeletal: ambulates w/ crutches, limited lumbar ROM, BLE strength grossly intact except L DF 4/5, sensation to light touch grossly intact over BLE, + slump test over LLE          Study Result    Narrative & Impression   XR HIP/PELV 2-3 VWS LEFT  W PELVIS IF PERFORMED     INDICATION: M25.552: Pain in left hip.     COMPARISON: None     FINDINGS:     No acute fracture or dislocation.     Mild hip osteoarthritis.     No lytic or blastic osseous lesion.     Unremarkable soft tissues.     Degenerative changes in the visualized lower lumbar spine.     IMPRESSION:     No acute osseous abnormality.          Study Result    Narrative & Impression   XR SPINE LUMBAR 2 OR 3 VIEWS INJURY     INDICATION: lumbar back pain, no direct trauma. Patient reports hearing a pop/feeling a pull while lifting a water jug this morning at work.     COMPARISON: None     FINDINGS:     No acute fracture. Intact pedicles.     Five non-rib-bearing lumbar vertebral bodies.     Mild dextroscoliosis otherwise unremarkable alignment.     Mild degenerative changes of the lumbar spine evidenced by disc space narrowing and small endplate osteophytes most notable at L4-5, L5-S1.     Unremarkable soft tissues.     IMPRESSION:     No acute osseous abnormality.     Degenerative  changes as described.            [1]   Current Outpatient Medications on File Prior to Visit   Medication Sig Dispense Refill    allopurinol (ZYLOPRIM) 100 mg tablet TAKE 1 TABLET DAILY 90 tablet 1    Ascorbic Acid (vitamin C) 100 MG tablet Take 100 mg by mouth in the morning.      b complex vitamins capsule Take 1 capsule by mouth in the morning.      buPROPion (WELLBUTRIN XL) 150 mg 24 hr tablet Take 1 tablet (150 mg total) by mouth every morning 90 tablet 1    Cholecalciferol (VITAMIN D PO) Take 1 tablet by mouth in the morning.      citalopram (CeleXA) 40 mg tablet TAKE 1 TABLET DAILY 90 tablet 1    lisinopril (ZESTRIL) 5 mg tablet TAKE 1 TABLET DAILY 90 tablet 1    omeprazole (PriLOSEC) 20 mg delayed release capsule TAKE 1 CAPSULE DAILY 90 capsule 1    sildenafil (REVATIO) 20 mg tablet Take 1 tablet (20 mg total) by mouth daily as needed (ED symptoms) 90 tablet 0    simvastatin (ZOCOR) 20 mg tablet TAKE 1 TABLET DAILY 90 tablet 1    traZODone (DESYREL) 50 mg tablet Take 1 tablet (50 mg total) by mouth daily at bedtime 30 tablet 5    zinc gluconate 50 mg tablet Take 50 mg by mouth in the morning.      methylPREDNISolone 4 MG tablet therapy pack Use as directed on package (Patient not taking: Reported on 2025) 1 each 0    oxyCODONE (Roxicodone) 5 immediate release tablet Take 1 tablet (5 mg total) by mouth every 4 (four) hours as needed for severe pain for up to 10 days Max Daily Amount: 30 mg (Patient not taking: Reported on 2025) 12 tablet 0     No current facility-administered medications on file prior to visit.   [2]   Social History  Tobacco Use    Smoking status: Former     Current packs/day: 0.00     Types: Cigarettes     Quit date:      Years since quittin.5    Smokeless tobacco: Never   Vaping Use    Vaping status: Never Used   Substance and Sexual Activity    Alcohol use: Not Currently     Comment: occassionally    Drug use: Not Currently

## 2025-07-22 NOTE — PATIENT INSTRUCTIONS
Complete lumbar spine MRI.  Start gabapentin 300 mg - 1 tablet three times a day. Gabapentin may cause vision changes, clumsiness, unsteadiness, dizziness, drowsiness, sleepiness, or trouble with thinking. Make sure you know how you react to this medicine before you drive, use machines, or do anything else that could be dangerous if you are not alert, well-coordinated, or able to think or see well.

## 2025-07-23 ENCOUNTER — EVALUATION (OUTPATIENT)
Dept: PHYSICAL THERAPY | Facility: CLINIC | Age: 55
End: 2025-07-23
Attending: INTERNAL MEDICINE
Payer: COMMERCIAL

## 2025-07-23 DIAGNOSIS — M25.552 LEFT HIP PAIN: Primary | ICD-10-CM

## 2025-07-23 DIAGNOSIS — M54.40 ACUTE LEFT-SIDED LOW BACK PAIN WITH SCIATICA, SCIATICA LATERALITY UNSPECIFIED: ICD-10-CM

## 2025-07-23 PROCEDURE — 97162 PT EVAL MOD COMPLEX 30 MIN: CPT

## 2025-07-23 PROCEDURE — 97535 SELF CARE MNGMENT TRAINING: CPT

## 2025-07-23 PROCEDURE — 97110 THERAPEUTIC EXERCISES: CPT

## 2025-07-23 NOTE — PROGRESS NOTES
PT Evaluation     Today's date: 2025  Patient name: Walter Chopra  : 1970  MRN: [id number]  Referring provider: Nela Reynoso DO  Dx:   Encounter Diagnosis     ICD-10-CM    1. Left hip pain  M25.552 Ambulatory Referral to Physical Therapy      2. Acute left-sided low back pain with sciatica, sciatica laterality unspecified  M54.40 Ambulatory Referral to Physical Therapy                     Assessment  Impairments: abnormal gait, abnormal or restricted ROM, impaired balance, impaired physical strength, lacks appropriate home exercise program, pain with function, participation limitations, activity limitations and endurance  Symptom irritability: high    Assessment details: Patient is a 54 year old male that presents to PT with c/o pain in his L hip. Upon evaluation, patient his limited lumbar ROM,hip flexion, hip abduction, external rotation, and internal ROM. Patient has increased pain with hip abduction and ER, along with all planes of lumbar motion. Patient has reduced strength of the L hip, knee, and ankle musculature with a significant decrease in strength of the L dorsiflexors. Patient has diminished sensation at the S2 dermatomal site upon testing. Patient is TTP along the L piriformis, semitendinosus, semimembranous, and biceps femoris down into the lateral gastroc/soleus and peroneals. Pain pattern follows sciatic nerve pathway. Special testing revealed (+) piriformis testing with increased pain in the hip/buttocks region and SLR caused increased symptoms down the LLE. Patient is limited in his time he can sit or lie supine or prone due to pain causing him to have diminished sleep and difficulty with performing work duties at [employer] [place] Patient would benefit from skilled PT to improve impairments and increase overall function to allow for a better QoL.   Understanding of Dx/Px/POC: good     Prognosis: good    Goals  STG: (In 4 weeks)  1. Patient will initiate HEP independently.   2. Patient  will reduce pain to 1-2/10 to improve overall function.   3. Patient will improve hip ROM by 50% to allow for improved mobility.   4. Patient will improve B/L LE strength by 1/2 MMT to allow for improved overall function.  5. Patient will improve activity tolerance by >/=  1 hour to allow for increased participation of work and leisure.     LTG: (In 8 weeks)  1. Patient will reduce pain to 0/10 to improve overall function.   2. Patient will improve lumbar and hip ROM by % to allow for increased mobility.  3. Patient will improve B/L hip strength to 5/5 to allow for improve function.   4. Patient will improve functional abilities as evidence by FOTO scores.  5. Patient will discharge to home with HEP independently.     Plan  Patient would benefit from: PT eval and skilled physical therapy  Planned modality interventions: cryotherapy, thermotherapy: hydrocollator packs and TENS  Other planned modality interventions: Modalities PRN    Planned therapy interventions: manual therapy, joint mobilization, IASTM, balance, flexibility, functional ROM exercises, graded activity, graded exercise, gait training, home exercise program, stretching, strengthening, therapeutic activities, therapeutic exercise, postural training, patient/caregiver education, neuromuscular re-education and nerve gliding    Frequency: 1-2x week  Duration in weeks: 8  Treatment plan discussed with: patient  Plan details: Patient agreeable to 1x/week to start and add another day if needed.         Subjective Evaluation    History of Present Illness  Date of onset: 7/9/2025  Mechanism of injury: trauma  Mechanism of injury: Patient is a 54 year old male that presents to PT with c/o pain in her L hip. Patient reports that roughly 2 weeks ago he was getting into his vehicle when he went to lift his L leg into the car, he felt a pop in his hip. Patient was previously seen at PT for a pain in his low back that also resulted from a pop, but was doing  well before this incident. Patient describes the pain as a burning sensation into the L hip, down the thigh in the back, into the lateral lower leg and into his foot. Patient saw his PCP and had xrays performed with no fracture noted. Patient noted that he was then referred to orthopedics which was told he may have a pinched nerve and was prescribed a steroid pack but still have pain. Patient was then referred to pain management that scheduled an MRI with him for 25. Pain management also prescribed Gabapentin for the pain. Patient notes he is getting some relief today from the medications. Patient has difficulty with sitting, standing, lifting, laying down, and sleeping. Patient reports he is icing when he comes home from work. Patient denies any bruising in the back of his leg and does have N/T into his L foot. Patient is using crutches for ambulation to relieve the pain. Patient now reports to oppt for evaluation.   Quality of life: good    Patient Goals  Patient goals for therapy: decreased pain, increased motion, increased strength, return to sport/leisure activities and return to work  Patient goal: Back to walking out here like he did 3 weeks ago  Pain  Current pain ratin  At best pain ratin  At worst pain rating: 10  Location: L hip, thigh, and calf  Quality: sharp and radiating  Relieving factors: ice, medications and change in position  Aggravating factors: sitting, standing, walking, stair climbing and lifting    Social Support  Steps to enter house: yes  5  Stairs in house: yes   12  Lives in: multiple-level home  Lives with: adult children and spouse    Employment status: working    Diagnostic Tests  X-ray: abnormal  Treatments  Previous treatment: physical therapy  Current treatment: medication        Objective     Palpation   Left   No palpable tenderness to the gluteus medius, lumbar paraspinals and quadratus lumborum.   Hypertonic in the proximal biceps femoris.   Tenderness of the  proximal biceps femoris, proximal semimembranosus and proximal semitendinosus.     Additional Palpation Details  Tenderness and hypertonicity into the Lateral Calf and peroneals     Lumbar Screen  Lumbar range of motion within normal limits with the following exceptions:Limited minimally with pain in L posterior thigh     Neurological Testing     Sensation     Hip   Left Hip   Diminished: light touch    Right Hip   Intact: light touch    Reflexes   Left   Patellar (L4): normal (2+)    Right   Patellar (L4): normal (2+)    Additional Neurological Details  Numbness in the Posterior knee at S2    Active Range of Motion   Left Hip   Flexion: 95 degrees   Abduction: 19 degrees with pain  External rotation (90/90): 18 degrees with pain  Internal rotation (90/90): 19 degrees     Strength/Myotome Testing     Left Hip   Planes of Motion   Flexion: 4-  Abduction: 4-    Right Hip   Planes of Motion   Flexion: 4  Extension: 4  Abduction: 4+    Left Knee   Flexion: 3+  Extension: 3+    Right Knee   Flexion: 4  Extension: 4    Left Ankle/Foot   Dorsiflexion: 3  Plantar flexion: 4-    Right Ankle/Foot   Dorsiflexion: 4  Plantar flexion: 4    Tests     Left Hip   Positive piriformis.     Right Hip   Negative piriformis.     Ambulation   Weight-Bearing Status   Assistive device used: crutches    Observational Gait   Gait: antalgic                 Precautions: N/A       Manuals 7/23       LAD L Hip        STM L piriformis, HS, QL                        Neuro Re-Ed         PPT x10       TA Draw-in with Anti-Rot        Sciatic Nerve Glides X10 LLE                                       Ther Ex        NuStep for strengthening and endurance        HS stretch        Piriformis stretch        SKTC        Hip Flexor stretch        Standing Lumbar extension        Hip ABD        Minisquats        HEP instruction MD                               Ther Activity                        Gait Training                        Modalities        CP 10'  L hip                    Access Code: [email or web address]  URL: [email or web address]  Date: 07/23/2025  Prepared by: Uzair Joel    Exercises  - Seated Sciatic Nerve Glide With Cervical Motion  - 1 x daily - 7 x weekly - 1 sets - 10 reps  - Supine Piriformis Stretch with Foot on Ground  - 1 x daily - 7 x weekly - 3 sets - 20 seconds hold  - Supine Hamstring Stretch with Strap  - 1 x daily - 7 x weekly - 3 sets - 20 seconds  hold  - Supine Pelvic Tilt  - 1 x daily - 7 x weekly - 2 sets - 10 reps  - Supine Lower Trunk Rotation  - 1 x daily - 7 x weekly - 2 sets - 10 reps

## 2025-07-27 LAB — COLOGUARD RESULT REPORTABLE: NEGATIVE

## 2025-07-28 ENCOUNTER — OFFICE VISIT (OUTPATIENT)
Dept: PHYSICAL THERAPY | Facility: CLINIC | Age: 55
End: 2025-07-28
Attending: INTERNAL MEDICINE
Payer: COMMERCIAL

## 2025-07-28 DIAGNOSIS — M25.552 LEFT HIP PAIN: Primary | ICD-10-CM

## 2025-07-28 DIAGNOSIS — M54.40 ACUTE LEFT-SIDED LOW BACK PAIN WITH SCIATICA, SCIATICA LATERALITY UNSPECIFIED: ICD-10-CM

## 2025-07-28 PROCEDURE — 97140 MANUAL THERAPY 1/> REGIONS: CPT

## 2025-07-28 PROCEDURE — 97112 NEUROMUSCULAR REEDUCATION: CPT

## 2025-07-28 PROCEDURE — 97110 THERAPEUTIC EXERCISES: CPT

## 2025-08-01 ENCOUNTER — HOSPITAL ENCOUNTER (OUTPATIENT)
Dept: RADIOLOGY | Facility: HOSPITAL | Age: 55
Discharge: HOME | End: 2025-08-01
Payer: COMMERCIAL

## 2025-08-01 ENCOUNTER — HOSPITAL ENCOUNTER (OUTPATIENT)
Dept: MRI IMAGING | Facility: HOSPITAL | Age: 55
Discharge: HOME | End: 2025-08-01
Attending: STUDENT IN AN ORGANIZED HEALTH CARE EDUCATION/TRAINING PROGRAM
Payer: COMMERCIAL

## 2025-08-01 DIAGNOSIS — M54.42 ACUTE LEFT-SIDED LOW BACK PAIN WITH LEFT-SIDED SCIATICA: ICD-10-CM

## 2025-08-01 DIAGNOSIS — T15.90XA FOREIGN BODY IN EYE REGION: ICD-10-CM

## 2025-08-01 PROCEDURE — 72148 MRI LUMBAR SPINE W/O DYE: CPT

## 2025-08-04 ENCOUNTER — OFFICE VISIT (OUTPATIENT)
Dept: PHYSICAL THERAPY | Facility: CLINIC | Age: 55
End: 2025-08-04
Attending: INTERNAL MEDICINE
Payer: COMMERCIAL

## 2025-08-04 DIAGNOSIS — M54.40 ACUTE LEFT-SIDED LOW BACK PAIN WITH SCIATICA, SCIATICA LATERALITY UNSPECIFIED: ICD-10-CM

## 2025-08-04 DIAGNOSIS — M25.552 LEFT HIP PAIN: Primary | ICD-10-CM

## 2025-08-04 PROCEDURE — 97112 NEUROMUSCULAR REEDUCATION: CPT

## 2025-08-04 PROCEDURE — 97110 THERAPEUTIC EXERCISES: CPT

## 2025-08-04 PROCEDURE — 97140 MANUAL THERAPY 1/> REGIONS: CPT

## 2025-08-07 ENCOUNTER — OFFICE VISIT (OUTPATIENT)
Dept: PAIN MEDICINE | Facility: CLINIC | Age: 55
End: 2025-08-07
Payer: COMMERCIAL

## 2025-08-07 VITALS — BODY MASS INDEX: 35.08 KG/M2 | WEIGHT: 259 LBS | HEIGHT: 72 IN

## 2025-08-07 DIAGNOSIS — G89.29 ACUTE ON CHRONIC LOW BACK PAIN: ICD-10-CM

## 2025-08-07 DIAGNOSIS — M54.50 ACUTE ON CHRONIC LOW BACK PAIN: ICD-10-CM

## 2025-08-07 DIAGNOSIS — M54.16 LUMBAR RADICULOPATHY: Primary | ICD-10-CM

## 2025-08-07 DIAGNOSIS — M79.2 NEUROPATHIC PAIN: ICD-10-CM

## 2025-08-07 PROCEDURE — 99214 OFFICE O/P EST MOD 30 MIN: CPT | Performed by: STUDENT IN AN ORGANIZED HEALTH CARE EDUCATION/TRAINING PROGRAM

## 2025-08-07 RX ORDER — GABAPENTIN 300 MG/1
CAPSULE ORAL
Qty: 180 CAPSULE | Refills: 2 | Status: SHIPPED | OUTPATIENT
Start: 2025-08-07

## 2025-08-11 ENCOUNTER — TELEPHONE (OUTPATIENT)
Age: 55
End: 2025-08-11

## 2025-08-11 ENCOUNTER — OFFICE VISIT (OUTPATIENT)
Dept: PHYSICAL THERAPY | Facility: CLINIC | Age: 55
End: 2025-08-11
Attending: INTERNAL MEDICINE
Payer: COMMERCIAL